# Patient Record
Sex: MALE | Race: WHITE | NOT HISPANIC OR LATINO
[De-identification: names, ages, dates, MRNs, and addresses within clinical notes are randomized per-mention and may not be internally consistent; named-entity substitution may affect disease eponyms.]

---

## 2018-02-06 ENCOUNTER — RECORD ABSTRACTING (OUTPATIENT)
Age: 78
End: 2018-02-06

## 2018-02-06 DIAGNOSIS — Z87.19 PERSONAL HISTORY OF OTHER DISEASES OF THE DIGESTIVE SYSTEM: ICD-10-CM

## 2018-03-27 ENCOUNTER — APPOINTMENT (OUTPATIENT)
Dept: UROLOGY | Facility: CLINIC | Age: 78
End: 2018-03-27
Payer: MEDICARE

## 2018-03-27 VITALS
HEART RATE: 80 BPM | SYSTOLIC BLOOD PRESSURE: 163 MMHG | DIASTOLIC BLOOD PRESSURE: 69 MMHG | BODY MASS INDEX: 28.76 KG/M2 | TEMPERATURE: 94.5 F | WEIGHT: 210 LBS | RESPIRATION RATE: 17 BRPM | HEIGHT: 71.5 IN

## 2018-03-27 PROCEDURE — 99203 OFFICE O/P NEW LOW 30 MIN: CPT

## 2018-05-12 RX ORDER — ASPIRIN 81 MG
81 TABLET, DELAYED RELEASE (ENTERIC COATED) ORAL DAILY
Refills: 0 | Status: ACTIVE | COMMUNITY

## 2018-05-15 ENCOUNTER — APPOINTMENT (OUTPATIENT)
Dept: CARDIOLOGY | Facility: CLINIC | Age: 78
End: 2018-05-15
Payer: MEDICARE

## 2018-05-15 VITALS
HEART RATE: 63 BPM | WEIGHT: 225 LBS | RESPIRATION RATE: 18 BRPM | HEIGHT: 71 IN | DIASTOLIC BLOOD PRESSURE: 70 MMHG | SYSTOLIC BLOOD PRESSURE: 140 MMHG | BODY MASS INDEX: 31.5 KG/M2

## 2018-05-15 PROCEDURE — 99214 OFFICE O/P EST MOD 30 MIN: CPT

## 2018-05-15 PROCEDURE — 93000 ELECTROCARDIOGRAM COMPLETE: CPT

## 2018-05-15 RX ORDER — TELMISARTAN 40 MG/1
40 TABLET ORAL DAILY
Refills: 0 | Status: DISCONTINUED | COMMUNITY
End: 2018-05-15

## 2018-09-25 ENCOUNTER — APPOINTMENT (OUTPATIENT)
Dept: UROLOGY | Facility: CLINIC | Age: 78
End: 2018-09-25
Payer: MEDICARE

## 2018-09-25 PROCEDURE — 99213 OFFICE O/P EST LOW 20 MIN: CPT

## 2018-09-26 LAB — PSA SERPL-MCNC: 4.18 NG/ML

## 2018-10-08 ENCOUNTER — RX RENEWAL (OUTPATIENT)
Age: 78
End: 2018-10-08

## 2018-10-16 ENCOUNTER — APPOINTMENT (OUTPATIENT)
Dept: CARDIOLOGY | Facility: CLINIC | Age: 78
End: 2018-10-16

## 2018-10-26 ENCOUNTER — APPOINTMENT (OUTPATIENT)
Dept: CARDIOLOGY | Facility: CLINIC | Age: 78
End: 2018-10-26
Payer: MEDICARE

## 2018-10-26 PROCEDURE — 93880 EXTRACRANIAL BILAT STUDY: CPT

## 2018-10-26 PROCEDURE — 93306 TTE W/DOPPLER COMPLETE: CPT

## 2018-11-30 ENCOUNTER — APPOINTMENT (OUTPATIENT)
Dept: CARDIOLOGY | Facility: CLINIC | Age: 78
End: 2018-11-30

## 2019-02-26 ENCOUNTER — RECORD ABSTRACTING (OUTPATIENT)
Age: 79
End: 2019-02-26

## 2019-02-26 RX ORDER — FINASTERIDE 5 MG/1
5 TABLET, FILM COATED ORAL DAILY
Qty: 90 | Refills: 3 | Status: DISCONTINUED | COMMUNITY
Start: 2018-09-25 | End: 2019-02-26

## 2019-02-26 RX ORDER — UBIDECARENONE 50 MG
CAPSULE ORAL DAILY
Refills: 0 | Status: ACTIVE | COMMUNITY

## 2019-02-28 ENCOUNTER — NON-APPOINTMENT (OUTPATIENT)
Age: 79
End: 2019-02-28

## 2019-02-28 ENCOUNTER — APPOINTMENT (OUTPATIENT)
Dept: CARDIOLOGY | Facility: CLINIC | Age: 79
End: 2019-02-28
Payer: MEDICARE

## 2019-02-28 VITALS
HEART RATE: 67 BPM | SYSTOLIC BLOOD PRESSURE: 130 MMHG | RESPIRATION RATE: 16 BRPM | BODY MASS INDEX: 31.08 KG/M2 | HEIGHT: 71 IN | WEIGHT: 222 LBS | DIASTOLIC BLOOD PRESSURE: 78 MMHG

## 2019-02-28 DIAGNOSIS — I25.2 OLD MYOCARDIAL INFARCTION: ICD-10-CM

## 2019-02-28 DIAGNOSIS — Z86.79 PERSONAL HISTORY OF OTHER DISEASES OF THE CIRCULATORY SYSTEM: ICD-10-CM

## 2019-02-28 PROCEDURE — 93000 ELECTROCARDIOGRAM COMPLETE: CPT

## 2019-02-28 PROCEDURE — 99214 OFFICE O/P EST MOD 30 MIN: CPT

## 2019-02-28 NOTE — PHYSICAL EXAM
[Normal Conjunctiva] : the conjunctiva exhibited no abnormalities [Eyelids - No Xanthelasma] : the eyelids demonstrated no xanthelasmas [Normal Oral Mucosa] : normal oral mucosa [No Oral Pallor] : no oral pallor [No Oral Cyanosis] : no oral cyanosis [Normal Jugular Venous A Waves Present] : normal jugular venous A waves present [Normal Jugular Venous V Waves Present] : normal jugular venous V waves present [No Jugular Venous Bone A Waves] : no jugular venous bone A waves [Respiration, Rhythm And Depth] : normal respiratory rhythm and effort [Exaggerated Use Of Accessory Muscles For Inspiration] : no accessory muscle use [Auscultation Breath Sounds / Voice Sounds] : lungs were clear to auscultation bilaterally [Heart Rate And Rhythm] : heart rate and rhythm were normal [Heart Sounds] : normal S1 and S2 [Murmurs] : no murmurs present [FreeTextEntry1] : Obese soft nontender no masses [Abnormal Walk] : normal gait [Gait - Sufficient For Exercise Testing] : the gait was sufficient for exercise testing [Nail Clubbing] : no clubbing of the fingernails [Cyanosis, Localized] : no localized cyanosis [Petechial Hemorrhages (___cm)] : no petechial hemorrhages [Skin Color & Pigmentation] : normal skin color and pigmentation [] : no rash [No Venous Stasis] : no venous stasis [Skin Lesions] : no skin lesions [No Skin Ulcers] : no skin ulcer [No Xanthoma] : no  xanthoma was observed [Oriented To Time, Place, And Person] : oriented to person, place, and time [Affect] : the affect was normal [Mood] : the mood was normal [No Anxiety] : not feeling anxious

## 2019-02-28 NOTE — HISTORY OF PRESENT ILLNESS
[FreeTextEntry1] : \par CHIEF COMPLAINT:  The patient is here for cardiac reevaluation.  History of:\par 1.	Coronary artery disease, prior inferior wall myocardial infarction.\par 2.	Hyperlipidemia.\par 3.	Mild obesity.\par \par \par

## 2019-02-28 NOTE — ASSESSMENT
[FreeTextEntry1] : ECG: Normal sinus rhythm 67. Frequent APCs. Possible old inferior infarct pattern. No significant ST or T wave changes.\par \par Carotid study 10/26/18:\par Mild bilateral plaquing. No hemodynamically significant stenosis.\par \par Echocardiogram 10/26/18:\par Small inferior wall MI. Left ventricular ejection fraction 70%.\par Mild mitral and tricuspid insufficiency.\par Unchanged from prior.\par  \par \par Laboratory data \par                 1/27/19:     2/2/18\par Cholesterol 108           194\par HDL              37             32\par LDL              43            125\par A1c             5.9              5.9\par \par 9/7/17, NUCLEAR SUMMARY\par 1. Abnormal Sestamibi myocardial perfusion SPECT imaging.\par 2. There was a small sized, moderate intensity, fixed defect of the basal inferior, basal inferoseptal\par and mid inferior wall consistent with infarction.\par 3. Left ventricular function was low normal with an EF of 52%.\par 4. There is mild hypokinesis of the basal inferior and mid inferior walls.\par 5. The EKG was negative for ischemia.\par 6. The patient developed occasional PACs during exercise.\par 7. The blood pressure response was normal.\par 8. The patient developed shortness of breath during the stress exam. The symptoms resolved with\par rest.\par 9. The test was terminated due to shortness of breath.\par 10. The patient's functional capacity was excellent.\par 11. The Duke Treadmill Score was 7, consistent with low risk.\par 12. When compared to prior study dated 5/28/2015, there has been no significant interval change.\par 13. Recommend clinical correlation with the above findings.\par \par Impression: \par 1. Patient is now moderately obese. BMI 31\par \par 2. EKG reflects APCs but no new ischemic changes with just old inferior infarct pattern.\par \par 2. Cholesterol previously was not adequately controlled for his profile. With addition of Cholestoff and diet LDL is at target \par \par 3. His last nuclear stress test September showed a fixed inferior wall defect but no significant ischemia. Echo confirms that same area and shows preserved fxn,\par \par 4. Only mild bilateral carotid plaquing\par \par Plan:\par 1. Continuation of current meds\par 2. Weight loss\par 3. Repeat blood work at [facility]D office\par \par Followup in 6 months\par \par \par

## 2019-02-28 NOTE — REASON FOR VISIT
[FreeTextEntry1] : He feels well.  He denies any chest pain, shortness of breath, palpitations, PND, orthopnea, or edema.  No other medical problems.\par \par He did not tolerate statins, but was put on Cholestoff by Dr Elder\par \par No other new medical issues

## 2019-03-05 ENCOUNTER — APPOINTMENT (OUTPATIENT)
Dept: UROLOGY | Facility: CLINIC | Age: 79
End: 2019-03-05

## 2019-08-20 ENCOUNTER — RECORD ABSTRACTING (OUTPATIENT)
Age: 79
End: 2019-08-20

## 2019-08-20 RX ORDER — TELMISARTAN 20 MG/1
20 TABLET ORAL
Refills: 0 | Status: ACTIVE | COMMUNITY

## 2019-08-20 RX ORDER — PLANT STANOL ESTER 450 MG
TABLET ORAL
Refills: 0 | Status: ACTIVE | COMMUNITY

## 2019-08-29 ENCOUNTER — NON-APPOINTMENT (OUTPATIENT)
Age: 79
End: 2019-08-29

## 2019-08-29 ENCOUNTER — APPOINTMENT (OUTPATIENT)
Dept: CARDIOLOGY | Facility: CLINIC | Age: 79
End: 2019-08-29
Payer: MEDICARE

## 2019-08-29 VITALS
SYSTOLIC BLOOD PRESSURE: 130 MMHG | OXYGEN SATURATION: 98 % | DIASTOLIC BLOOD PRESSURE: 65 MMHG | WEIGHT: 224 LBS | HEIGHT: 71 IN | HEART RATE: 74 BPM | BODY MASS INDEX: 31.36 KG/M2 | RESPIRATION RATE: 16 BRPM

## 2019-08-29 DIAGNOSIS — Z00.00 ENCOUNTER FOR GENERAL ADULT MEDICAL EXAMINATION W/OUT ABNORMAL FINDINGS: ICD-10-CM

## 2019-08-29 DIAGNOSIS — N20.0 CALCULUS OF KIDNEY: ICD-10-CM

## 2019-08-29 PROCEDURE — 99214 OFFICE O/P EST MOD 30 MIN: CPT

## 2019-08-29 PROCEDURE — 93000 ELECTROCARDIOGRAM COMPLETE: CPT

## 2019-08-29 NOTE — PHYSICAL EXAM
[Normal Conjunctiva] : the conjunctiva exhibited no abnormalities [Eyelids - No Xanthelasma] : the eyelids demonstrated no xanthelasmas [No Oral Pallor] : no oral pallor [Normal Oral Mucosa] : normal oral mucosa [Normal Jugular Venous A Waves Present] : normal jugular venous A waves present [No Oral Cyanosis] : no oral cyanosis [Normal Jugular Venous V Waves Present] : normal jugular venous V waves present [No Jugular Venous Bone A Waves] : no jugular venous bone A waves [Exaggerated Use Of Accessory Muscles For Inspiration] : no accessory muscle use [Respiration, Rhythm And Depth] : normal respiratory rhythm and effort [Heart Sounds] : normal S1 and S2 [Auscultation Breath Sounds / Voice Sounds] : lungs were clear to auscultation bilaterally [Heart Rate And Rhythm] : heart rate and rhythm were normal [Murmurs] : no murmurs present [Abnormal Walk] : normal gait [Gait - Sufficient For Exercise Testing] : the gait was sufficient for exercise testing [Cyanosis, Localized] : no localized cyanosis [Petechial Hemorrhages (___cm)] : no petechial hemorrhages [Nail Clubbing] : no clubbing of the fingernails [Skin Color & Pigmentation] : normal skin color and pigmentation [] : no rash [No Venous Stasis] : no venous stasis [Skin Lesions] : no skin lesions [No Skin Ulcers] : no skin ulcer [No Xanthoma] : no  xanthoma was observed [Oriented To Time, Place, And Person] : oriented to person, place, and time [Affect] : the affect was normal [Mood] : the mood was normal [No Anxiety] : not feeling anxious [FreeTextEntry1] : Obese soft nontender no masses

## 2019-08-29 NOTE — REASON FOR VISIT
[FreeTextEntry1] : He feels well.  He denies any chest pain, shortness of breath, palpitations, PND, orthopnea, or edema.  No other medical problems.\par \par HX of statin intolerance, now tolerating Cholestoff prescribed by Dr Elder\par \par No other new medical issues or recent hospitalizations\par \par \par He has no regular exercise regimen, with her, is physically very active without any exertional complaints.

## 2019-08-29 NOTE — ASSESSMENT
[FreeTextEntry1] : ECG: NSR at 74 BPM, No sig ST or T wave abn\par \par Carotid study 10/26/18:\par Mild bilateral plaquing. No hemodynamically significant stenosis.\par \par Echocardiogram 10/26/18:\par Small inferior wall MI. Left ventricular ejection fraction 70%.\par Mild mitral and tricuspid insufficiency.\par Unchanged from prior.\par  \par \par Laboratory data \par                 1/27/19:     2/2/18\par Cholesterol 108           194\par HDL              37             32\par LDL              43            125\par A1c             5.9              5.9\par \par 9/7/17, NUCLEAR SUMMARY\par 1. Abnormal Sestamibi myocardial perfusion SPECT imaging.\par 2. There was a small sized, moderate intensity, fixed defect of the basal inferior, basal inferoseptal\par and mid inferior wall consistent with infarction.\par 3. Left ventricular function was low normal with an EF of 52%.\par 4. There is mild hypokinesis of the basal inferior and mid inferior walls.\par 5. The EKG was negative for ischemia.\par 6. The patient developed occasional PACs during exercise.\par 7. The blood pressure response was normal.\par 8. The patient developed shortness of breath during the stress exam. The symptoms resolved with\par rest.\par 9. The test was terminated due to shortness of breath.\par 10. The patient's functional capacity was excellent.\par 11. The Duke Treadmill Score was 7, consistent with low risk.\par 12. When compared to prior study dated 5/28/2015, there has been no significant interval change.\par 13. Recommend clinical correlation with the above findings.\par \par Impression: \par 1. Patient has maintained a BMI of 31 and aware of that .\par \par 2. EKG reflects NSR, no APCs seen.\par \par 2. Cholesterol previously was not adequately controlled for his profile. With addition of Cholestoff and diet LDL is at target\par \par 3. His last nuclear stress test September showed a fixed inferior wall defect but no significant ischemia. Echo confirms that same area and shows preserved fxn,\par \par 4. Only mild bilateral carotid plaquing\par \par 5.Blood pressure today 130/65 and stable\par \par Plan:\par 1. Continuation of current meds\par 2. Encouraged to increase exercise regimen to 3-4 days a week as tolerated.\par 3. Repeat blood work at PMD office and have faxed to my office.\par 4. Diet modification to help prompt weight loss\par 5. Repeat nuclear stress test to reassess cardiac tolerance and history fixed inferior wall defect.\par \par \par Followup in 6 months\par \par \par

## 2020-01-06 ENCOUNTER — MEDICATION RENEWAL (OUTPATIENT)
Age: 80
End: 2020-01-06

## 2020-01-06 ENCOUNTER — RX RENEWAL (OUTPATIENT)
Age: 80
End: 2020-01-06

## 2020-01-08 ENCOUNTER — APPOINTMENT (OUTPATIENT)
Dept: CARDIOLOGY | Facility: CLINIC | Age: 80
End: 2020-01-08
Payer: MEDICARE

## 2020-01-08 PROCEDURE — A9500: CPT

## 2020-01-08 PROCEDURE — 78452 HT MUSCLE IMAGE SPECT MULT: CPT

## 2020-01-08 PROCEDURE — 93015 CV STRESS TEST SUPVJ I&R: CPT

## 2020-01-10 RX ORDER — KIT FOR THE PREPARATION OF TECHNETIUM TC99M SESTAMIBI 1 MG/5ML
INJECTION, POWDER, LYOPHILIZED, FOR SOLUTION PARENTERAL
Refills: 0 | Status: COMPLETED | OUTPATIENT
Start: 2020-01-10

## 2020-01-10 RX ADMIN — KIT FOR THE PREPARATION OF TECHNETIUM TC99M SESTAMIBI 0: 1 INJECTION, POWDER, LYOPHILIZED, FOR SOLUTION PARENTERAL at 00:00

## 2020-03-03 ENCOUNTER — EMERGENCY (EMERGENCY)
Facility: HOSPITAL | Age: 80
LOS: 1 days | Discharge: HOME | End: 2020-03-03
Attending: EMERGENCY MEDICINE
Payer: MEDICARE

## 2020-03-03 VITALS
HEIGHT: 71.5 IN | OXYGEN SATURATION: 99 % | HEART RATE: 65 BPM | WEIGHT: 210.1 LBS | DIASTOLIC BLOOD PRESSURE: 82 MMHG | RESPIRATION RATE: 18 BRPM | SYSTOLIC BLOOD PRESSURE: 152 MMHG | TEMPERATURE: 98 F

## 2020-03-03 VITALS
TEMPERATURE: 98 F | HEART RATE: 67 BPM | RESPIRATION RATE: 20 BRPM | OXYGEN SATURATION: 98 % | SYSTOLIC BLOOD PRESSURE: 145 MMHG | DIASTOLIC BLOOD PRESSURE: 70 MMHG

## 2020-03-03 LAB
ALBUMIN SERPL ELPH-MCNC: 4.5 G/DL — SIGNIFICANT CHANGE UP (ref 3.3–5.2)
ALP SERPL-CCNC: 76 U/L — SIGNIFICANT CHANGE UP (ref 40–120)
ALT FLD-CCNC: 23 U/L — SIGNIFICANT CHANGE UP
ANION GAP SERPL CALC-SCNC: 14 MMOL/L — SIGNIFICANT CHANGE UP (ref 5–17)
APPEARANCE UR: CLEAR — SIGNIFICANT CHANGE UP
AST SERPL-CCNC: 23 U/L — SIGNIFICANT CHANGE UP
BACTERIA # UR AUTO: NEGATIVE — SIGNIFICANT CHANGE UP
BASOPHILS # BLD AUTO: 0.07 K/UL — SIGNIFICANT CHANGE UP (ref 0–0.2)
BASOPHILS NFR BLD AUTO: 0.7 % — SIGNIFICANT CHANGE UP (ref 0–2)
BILIRUB SERPL-MCNC: 0.7 MG/DL — SIGNIFICANT CHANGE UP (ref 0.4–2)
BILIRUB UR-MCNC: NEGATIVE — SIGNIFICANT CHANGE UP
BUN SERPL-MCNC: 27 MG/DL — HIGH (ref 8–20)
CALCIUM SERPL-MCNC: 10 MG/DL — SIGNIFICANT CHANGE UP (ref 8.6–10.2)
CHLORIDE SERPL-SCNC: 99 MMOL/L — SIGNIFICANT CHANGE UP (ref 98–107)
CO2 SERPL-SCNC: 26 MMOL/L — SIGNIFICANT CHANGE UP (ref 22–29)
COLOR SPEC: YELLOW — SIGNIFICANT CHANGE UP
CREAT SERPL-MCNC: 1.09 MG/DL — SIGNIFICANT CHANGE UP (ref 0.5–1.3)
DIFF PNL FLD: NEGATIVE — SIGNIFICANT CHANGE UP
EOSINOPHIL # BLD AUTO: 0.43 K/UL — SIGNIFICANT CHANGE UP (ref 0–0.5)
EOSINOPHIL NFR BLD AUTO: 4.2 % — SIGNIFICANT CHANGE UP (ref 0–6)
EPI CELLS # UR: NEGATIVE — SIGNIFICANT CHANGE UP
GLUCOSE SERPL-MCNC: 117 MG/DL — HIGH (ref 70–99)
GLUCOSE UR QL: NEGATIVE MG/DL — SIGNIFICANT CHANGE UP
HCT VFR BLD CALC: 42 % — SIGNIFICANT CHANGE UP (ref 39–50)
HGB BLD-MCNC: 13.5 G/DL — SIGNIFICANT CHANGE UP (ref 13–17)
IMM GRANULOCYTES NFR BLD AUTO: 0.8 % — SIGNIFICANT CHANGE UP (ref 0–1.5)
KETONES UR-MCNC: NEGATIVE — SIGNIFICANT CHANGE UP
LEUKOCYTE ESTERASE UR-ACNC: NEGATIVE — SIGNIFICANT CHANGE UP
LIDOCAIN IGE QN: 60 U/L — HIGH (ref 22–51)
LYMPHOCYTES # BLD AUTO: 1.5 K/UL — SIGNIFICANT CHANGE UP (ref 1–3.3)
LYMPHOCYTES # BLD AUTO: 14.7 % — SIGNIFICANT CHANGE UP (ref 13–44)
MCHC RBC-ENTMCNC: 28.7 PG — SIGNIFICANT CHANGE UP (ref 27–34)
MCHC RBC-ENTMCNC: 32.1 GM/DL — SIGNIFICANT CHANGE UP (ref 32–36)
MCV RBC AUTO: 89.2 FL — SIGNIFICANT CHANGE UP (ref 80–100)
MONOCYTES # BLD AUTO: 0.76 K/UL — SIGNIFICANT CHANGE UP (ref 0–0.9)
MONOCYTES NFR BLD AUTO: 7.4 % — SIGNIFICANT CHANGE UP (ref 2–14)
NEUTROPHILS # BLD AUTO: 7.39 K/UL — SIGNIFICANT CHANGE UP (ref 1.8–7.4)
NEUTROPHILS NFR BLD AUTO: 72.2 % — SIGNIFICANT CHANGE UP (ref 43–77)
NITRITE UR-MCNC: NEGATIVE — SIGNIFICANT CHANGE UP
PH UR: 5 — SIGNIFICANT CHANGE UP (ref 5–8)
PLATELET # BLD AUTO: 310 K/UL — SIGNIFICANT CHANGE UP (ref 150–400)
POTASSIUM SERPL-MCNC: 4.3 MMOL/L — SIGNIFICANT CHANGE UP (ref 3.5–5.3)
POTASSIUM SERPL-SCNC: 4.3 MMOL/L — SIGNIFICANT CHANGE UP (ref 3.5–5.3)
PROT SERPL-MCNC: 7.9 G/DL — SIGNIFICANT CHANGE UP (ref 6.6–8.7)
PROT UR-MCNC: 15 MG/DL
RBC # BLD: 4.71 M/UL — SIGNIFICANT CHANGE UP (ref 4.2–5.8)
RBC # FLD: 13.9 % — SIGNIFICANT CHANGE UP (ref 10.3–14.5)
RBC CASTS # UR COMP ASSIST: NEGATIVE /HPF — SIGNIFICANT CHANGE UP (ref 0–4)
SODIUM SERPL-SCNC: 139 MMOL/L — SIGNIFICANT CHANGE UP (ref 135–145)
SP GR SPEC: 1.02 — SIGNIFICANT CHANGE UP (ref 1.01–1.02)
UROBILINOGEN FLD QL: NEGATIVE MG/DL — SIGNIFICANT CHANGE UP
WBC # BLD: 10.23 K/UL — SIGNIFICANT CHANGE UP (ref 3.8–10.5)
WBC # FLD AUTO: 10.23 K/UL — SIGNIFICANT CHANGE UP (ref 3.8–10.5)
WBC UR QL: SIGNIFICANT CHANGE UP

## 2020-03-03 PROCEDURE — 93010 ELECTROCARDIOGRAM REPORT: CPT

## 2020-03-03 PROCEDURE — 76705 ECHO EXAM OF ABDOMEN: CPT

## 2020-03-03 PROCEDURE — 83690 ASSAY OF LIPASE: CPT

## 2020-03-03 PROCEDURE — 36415 COLL VENOUS BLD VENIPUNCTURE: CPT

## 2020-03-03 PROCEDURE — 76705 ECHO EXAM OF ABDOMEN: CPT | Mod: 26

## 2020-03-03 PROCEDURE — 93005 ELECTROCARDIOGRAM TRACING: CPT

## 2020-03-03 PROCEDURE — 85027 COMPLETE CBC AUTOMATED: CPT

## 2020-03-03 PROCEDURE — 80053 COMPREHEN METABOLIC PANEL: CPT

## 2020-03-03 PROCEDURE — 99284 EMERGENCY DEPT VISIT MOD MDM: CPT | Mod: 25

## 2020-03-03 PROCEDURE — 81001 URINALYSIS AUTO W/SCOPE: CPT

## 2020-03-03 PROCEDURE — 74177 CT ABD & PELVIS W/CONTRAST: CPT

## 2020-03-03 PROCEDURE — 74177 CT ABD & PELVIS W/CONTRAST: CPT | Mod: 26

## 2020-03-03 PROCEDURE — 96374 THER/PROPH/DIAG INJ IV PUSH: CPT | Mod: XU

## 2020-03-03 PROCEDURE — 96375 TX/PRO/DX INJ NEW DRUG ADDON: CPT

## 2020-03-03 PROCEDURE — 99285 EMERGENCY DEPT VISIT HI MDM: CPT

## 2020-03-03 RX ORDER — SODIUM CHLORIDE 9 MG/ML
3 INJECTION INTRAMUSCULAR; INTRAVENOUS; SUBCUTANEOUS ONCE
Refills: 0 | Status: COMPLETED | OUTPATIENT
Start: 2020-03-03 | End: 2020-03-03

## 2020-03-03 RX ORDER — HYDROMORPHONE HYDROCHLORIDE 2 MG/ML
1 INJECTION INTRAMUSCULAR; INTRAVENOUS; SUBCUTANEOUS ONCE
Refills: 0 | Status: DISCONTINUED | OUTPATIENT
Start: 2020-03-03 | End: 2020-03-03

## 2020-03-03 RX ORDER — MORPHINE SULFATE 50 MG/1
4 CAPSULE, EXTENDED RELEASE ORAL ONCE
Refills: 0 | Status: DISCONTINUED | OUTPATIENT
Start: 2020-03-03 | End: 2020-03-03

## 2020-03-03 RX ORDER — METOCLOPRAMIDE HCL 10 MG
10 TABLET ORAL ONCE
Refills: 0 | Status: COMPLETED | OUTPATIENT
Start: 2020-03-03 | End: 2020-03-03

## 2020-03-03 RX ORDER — FAMOTIDINE 10 MG/ML
20 INJECTION INTRAVENOUS ONCE
Refills: 0 | Status: COMPLETED | OUTPATIENT
Start: 2020-03-03 | End: 2020-03-03

## 2020-03-03 RX ADMIN — Medication 104 MILLIGRAM(S): at 02:56

## 2020-03-03 RX ADMIN — MORPHINE SULFATE 4 MILLIGRAM(S): 50 CAPSULE, EXTENDED RELEASE ORAL at 03:05

## 2020-03-03 RX ADMIN — Medication 10 MILLIGRAM(S): at 02:57

## 2020-03-03 RX ADMIN — FAMOTIDINE 20 MILLIGRAM(S): 10 INJECTION INTRAVENOUS at 02:57

## 2020-03-03 RX ADMIN — SODIUM CHLORIDE 3 MILLILITER(S): 9 INJECTION INTRAMUSCULAR; INTRAVENOUS; SUBCUTANEOUS at 02:57

## 2020-03-03 RX ADMIN — HYDROMORPHONE HYDROCHLORIDE 1 MILLIGRAM(S): 2 INJECTION INTRAMUSCULAR; INTRAVENOUS; SUBCUTANEOUS at 03:58

## 2020-03-03 RX ADMIN — HYDROMORPHONE HYDROCHLORIDE 1 MILLIGRAM(S): 2 INJECTION INTRAMUSCULAR; INTRAVENOUS; SUBCUTANEOUS at 06:00

## 2020-03-03 RX ADMIN — MORPHINE SULFATE 4 MILLIGRAM(S): 50 CAPSULE, EXTENDED RELEASE ORAL at 07:20

## 2020-03-03 NOTE — ED PROVIDER NOTE - NSFOLLOWUPINSTRUCTIONS_ED_ALL_ED_FT
Avoid spicy, fried and greasy foods   Follow up with your surgeon at [facility]-call today to schedule appt  Return sooner for any problems

## 2020-03-03 NOTE — ED ADULT NURSE REASSESSMENT NOTE - NS ED NURSE REASSESS COMMENT FT1
Pt. mental status unchanged. Pt. respirations even and unlabored. Pt. resting comfortably in stretcher. pt. states his pain is about a 5 and tolerable. Pt. to go to [facility] of gallbladder.

## 2020-03-03 NOTE — ED PROVIDER NOTE - PATIENT PORTAL LINK FT
You can access the FollowMyHealth Patient Portal offered by [facility] by registering at the following website: [email or web address]. By joining [facility]’s FollowMyHealth portal, you will also be able to view your health information using other applications (apps) compatible with our system.

## 2020-03-03 NOTE — ED PROVIDER NOTE - CLINICAL SUMMARY MEDICAL DECISION MAKING FREE TEXT BOX
Will check labs, EKG, CT abd/pelvis, will give Regulon and Pepcid, and reevaluate after medications.

## 2020-03-03 NOTE — ED PROVIDER NOTE - CONSTITUTIONAL, MLM
normal... Awake, alert, oriented to person, place, time/situation, appears uncomfortable, and in no apparent distress.

## 2020-03-03 NOTE — ED PROVIDER NOTE - OBJECTIVE STATEMENT
78 y/o M pt, with PMHx of HTN, presents to the ED c/o abd pain that began last night. Pt reports epigastric pain that began last night, with frequent belching, which he notes improves his pain. Pt notes that he had 10 spicy chicken wings 4 hours earlier. Denies CP, SOB, acute bloating, nausea, vomiting, any abd PSHx, any known allergies, EtOH consumption. Last BM was yesterday afternoon. Pt took Mylanta and Omeprazole with no relief. PMD: Dr. Lopez. Cardiologist: Dr. Dos Santos. Pt states that he has had a couple of cardiac nukes a couple months ago. Pt is a former smoker, quit 30 years ago.

## 2020-03-03 NOTE — ED PROVIDER NOTE - PROGRESS NOTE DETAILS
Labs, CT and US results reviewed with pt.  Pt pin free at this time and has a surgeon already at [facility] who he would like to follow up with.  Copies of labs and  results given to pt and advised to eat low fat diet  and tor return sooner for any problems

## 2020-03-03 NOTE — ED ADULT NURSE NOTE - OBJECTIVE STATEMENT
Assumed tp.c are at 0230. Pt. A&Ox3. Pt. respirations even and unlabored. Pt. states upper epigastric pain started at 11pm. Pt. nontender. Pt. has hx of IBs. Pt. had ct and found gall stones but the doctor is not sure. Pt. is belching. Denies N/V/D. Pt. is belching. Pt. states he had fried wings for dinner. Assumed pt.. care at 0230. Pt. A&Ox3. Pt. respirations even and unlabored. Pt. states upper epigastric pain started at 11pm. Pt. abdomen nontender. Pt. has hx of IBS vs gallstones. Pt. had ct and found gall stones but the doctor is not sure. Pt. is belching.  Pt. is belching. Pt. states he had fried wings for dinner. Denies N/V/D.

## 2020-09-11 ENCOUNTER — APPOINTMENT (OUTPATIENT)
Dept: UROLOGY | Facility: CLINIC | Age: 80
End: 2020-09-11
Payer: MEDICARE

## 2020-09-11 VITALS
DIASTOLIC BLOOD PRESSURE: 76 MMHG | BODY MASS INDEX: 28 KG/M2 | SYSTOLIC BLOOD PRESSURE: 134 MMHG | HEIGHT: 71 IN | HEART RATE: 60 BPM | RESPIRATION RATE: 17 BRPM | WEIGHT: 200 LBS

## 2020-09-11 VITALS — TEMPERATURE: 97.9 F

## 2020-09-11 DIAGNOSIS — N13.8 BENIGN PROSTATIC HYPERPLASIA WITH LOWER URINARY TRACT SYMPMS: ICD-10-CM

## 2020-09-11 DIAGNOSIS — N40.1 BENIGN PROSTATIC HYPERPLASIA WITH LOWER URINARY TRACT SYMPMS: ICD-10-CM

## 2020-09-11 PROCEDURE — 99214 OFFICE O/P EST MOD 30 MIN: CPT

## 2020-09-11 NOTE — PHYSICAL EXAM
[General Appearance - Well Developed] : well developed [Bowel Sounds] : normal bowel sounds [Prostate Size ___ gm] : prostate size [unfilled] gm [] : no respiratory distress [Oriented To Time, Place, And Person] : oriented to person, place, and time [Normal Station and Gait] : the gait and station were normal for the patient's age [No Focal Deficits] : no focal deficits

## 2020-09-11 NOTE — HISTORY OF PRESENT ILLNESS
[Nocturia] : nocturia [FreeTextEntry1] : PSA of 7 in 2015 We put him on finasteride which he took for a short time . His PSA reduced . He stopped finasteride when he ran out ad his PSA went up to 11.85

## 2020-09-11 NOTE — ASSESSMENT
[FreeTextEntry1] : We will restart finasteride . We will repeat MRI . He has no specific urological complaints .

## 2020-10-19 ENCOUNTER — APPOINTMENT (OUTPATIENT)
Dept: UROLOGY | Facility: CLINIC | Age: 80
End: 2020-10-19
Payer: MEDICARE

## 2020-10-19 ENCOUNTER — OUTPATIENT (OUTPATIENT)
Dept: OUTPATIENT SERVICES | Facility: HOSPITAL | Age: 80
LOS: 1 days | End: 2020-10-19
Payer: MEDICARE

## 2020-10-19 VITALS
DIASTOLIC BLOOD PRESSURE: 71 MMHG | BODY MASS INDEX: 28 KG/M2 | HEIGHT: 71 IN | WEIGHT: 200 LBS | RESPIRATION RATE: 16 BRPM | SYSTOLIC BLOOD PRESSURE: 125 MMHG | HEART RATE: 45 BPM

## 2020-10-19 VITALS — TEMPERATURE: 97.3 F

## 2020-10-19 DIAGNOSIS — R35.0 FREQUENCY OF MICTURITION: ICD-10-CM

## 2020-10-19 PROCEDURE — 76872 US TRANSRECTAL: CPT

## 2020-10-19 PROCEDURE — 55700: CPT

## 2020-10-19 PROCEDURE — 76377 3D RENDER W/INTRP POSTPROCES: CPT | Mod: 26

## 2020-10-19 PROCEDURE — 88305 TISSUE EXAM BY PATHOLOGIST: CPT | Mod: 26

## 2020-10-19 PROCEDURE — 76942 ECHO GUIDE FOR BIOPSY: CPT | Mod: 59

## 2020-10-19 PROCEDURE — 76942 ECHO GUIDE FOR BIOPSY: CPT | Mod: 26,59

## 2020-10-19 PROCEDURE — 76872 US TRANSRECTAL: CPT | Mod: 26

## 2020-10-21 ENCOUNTER — NON-APPOINTMENT (OUTPATIENT)
Age: 80
End: 2020-10-21

## 2020-10-22 DIAGNOSIS — R97.20 ELEVATED PROSTATE SPECIFIC ANTIGEN [PSA]: ICD-10-CM

## 2020-10-27 ENCOUNTER — NON-APPOINTMENT (OUTPATIENT)
Age: 80
End: 2020-10-27

## 2020-10-27 LAB — CORE LAB BIOPSY: NORMAL

## 2020-11-06 ENCOUNTER — OUTPATIENT (OUTPATIENT)
Dept: OUTPATIENT SERVICES | Facility: HOSPITAL | Age: 80
LOS: 1 days | Discharge: HOME | End: 2020-11-06
Payer: MEDICARE

## 2020-11-12 ENCOUNTER — APPOINTMENT (OUTPATIENT)
Dept: MULTI SPECIALTY CLINIC | Facility: CLINIC | Age: 80
End: 2020-11-12
Payer: MEDICARE

## 2020-11-12 VITALS
SYSTOLIC BLOOD PRESSURE: 120 MMHG | HEIGHT: 71 IN | HEART RATE: 57 BPM | DIASTOLIC BLOOD PRESSURE: 67 MMHG | RESPIRATION RATE: 17 BRPM | WEIGHT: 200 LBS | BODY MASS INDEX: 28 KG/M2

## 2020-11-12 VITALS — TEMPERATURE: 97.2 F

## 2020-11-12 PROCEDURE — 99215 OFFICE O/P EST HI 40 MIN: CPT

## 2020-11-12 PROCEDURE — 99204 OFFICE O/P NEW MOD 45 MIN: CPT

## 2020-11-12 RX ORDER — DIAZEPAM 5 MG/1
5 TABLET ORAL
Qty: 1 | Refills: 0 | Status: COMPLETED | COMMUNITY
Start: 2020-10-07 | End: 2020-11-12

## 2020-11-12 NOTE — PHYSICAL EXAM
[Normal] : oriented to person, place and time, the affect was normal, the mood was normal and not anxious [Sclera] : the sclera and conjunctiva were normal [Outer Ear] : the ears and nose were normal in appearance [] : no respiratory distress [Heart Rate And Rhythm] : heart rate and rhythm were normal [Abdomen Soft] : soft [Nondistended] : nondistended [Abdomen Tenderness] : non-tender [Musculoskeletal - Swelling] : no joint swelling [Nail Clubbing] : no clubbing  or cyanosis of the fingernails

## 2020-11-15 PROCEDURE — 77263 THER RADIOLOGY TX PLNG CPLX: CPT

## 2020-11-16 NOTE — REVIEW OF SYSTEMS
[Nocturia] : nocturia [Negative] : Endocrine [Hematuria: Grade 0] : Hematuria: Grade 0 [Urinary Incontinence: Grade 0] : Urinary Incontinence: Grade 0  [Urinary Retention: Grade 0] : Urinary Retention: Grade 0 [Urinary Tract Pain: Grade 0] : Urinary Tract Pain: Grade 0 [Urinary Urgency: Grade 0] : Urinary Urgency: Grade 0 [Urinary Frequency: Grade 0] : Urinary Frequency: Grade 0 [Ejaculation Disorder: Grade 1 - Diminished ejaculation] : Ejaculation Disorder: Grade 1 - Diminished ejaculation  [Erectile Dysfunction: Grade 1 - Decrease in erectile function (frequency or rigidity of erections) but intervention not indicated (e.g., medication or use of mechanical device, penile pump)] : Erectile Dysfunction: Grade 1 - Decrease in erectile function (frequency or rigidity of erections) but intervention not indicated (e.g., medication or use of mechanical device, penile pump) [Urinary Frequency] : no urinary frequency

## 2020-11-16 NOTE — DISEASE MANAGEMENT
[1] : T1 [c] : c [7(4+3)] : Lucretia Score 7(4+3) [] : Patient had a Prostate MRI [4] : 4 [IIC] : IIC [BiopsyDate] : 10/2020 [MeasuredProstateVolume] : 46 [TotalCores] : 4 [TotalPositiveCores] : 12 [MaxCoreInvolvement] : 40

## 2020-11-16 NOTE — HISTORY OF PRESENT ILLNESS
[FreeTextEntry1] : Mr. 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 He presents to multidisciplinary clinic to discuss his treatment options. \par \par Oncological History\par PSA Trend ng/mL\par 12/29/2014 - 5.09\par 09/12/2015 - 4.73\par 03/15/2017 - 5.82\par 06/08/2017 - 6.99\par 02/02/2018 - 7.27\par 03/10/2018 - 7.97\par 09/25/2018 - 4.18\par 02/28/2019 - 4.88\par 05/25/2019 - 5.06\par 11/02/2019 - 7.99\par 08/11/2020 - 11.85\par \par 9/21/20 - MRI of prostate showed 35cc prostate, 2 lesions, right posterior lateral PZ 8x7mm, and left posterior lateral PZ 9x5mm. PIRADS 4. No KD, SV or lymph nodes involvement.\par  \par 10/19/20 - Underwent MRI fused biopsy. Prostate US size 46cc. Pathology showing 4/12 positive cores. MRI target#1 - GS 4+3=7 - 2/2 cores, 20-30% of core with perineural invasion, MRI target#2 negative. Additionally 3/10 cores positive with GS 4+3=7 involving 40% of core with perineural invasion and further GS 3+4=7 and GS3+3=6. \par \par Patient presents today to discuss his radiation treatment option. No FH of cancer. He report nocturia 1-2 times per night and denies dysuria, hematuria, hesitancy, frequency and urgency. He has regular daily bowel function and he is not sexually active.

## 2020-11-24 NOTE — DISEASE MANAGEMENT
[1] : T1 [c] : c [0] : N0 [X] : MX [10-20] : 10 - 20 ng/mL [7(4+3)] : Lucretia Score 7(4+3) [] : Patient had a Prostate MRI [4] : 4 [IIC] : IIC [BiopsyDate] : 10/19/2020 [MeasuredProstateVolume] : 46 mL [TotalCores] : 4 [TotalPositiveCores] : 12 [MaxCoreInvolvement] : 40% [FreeTextEntry7] : PSA at diagnosis 11.85 ng/mL\par MRI prostate 9/21/2020: 35 mL prostate, PIRAD 4 lesion, no KD/SV invasion.

## 2020-11-24 NOTE — LETTER CLOSING
[Consult Closing:] : Thank you for allowing me to participate in the care of this patient.  If you have any questions, please do not hesitate to contact me. [Sincerely yours,] : Sincerely yours, [FreeTextEntry3] : Arian Chan MD\par System Chief of Urology, [facility] [place]\par  of Urology\par [facility]\par [facility] \par [address] [place] [address], [address] \[place] [address]

## 2020-11-24 NOTE — HISTORY OF PRESENT ILLNESS
[FreeTextEntry1] : Patient was referred by Dr. Marlow to [facility].\par Long history of elevated PSA.  Recently PSA increased to 11.85 ng/mL.\par Underwent MRI prostate September 2020.  Findings showed PI-RADS 4 lesion.  Underwent MRI/US fusion biopsy of the prostate 10/19/2020.  Findings revealed multiple cores prostate cancer, highest Lucretia 7 (4+3).\par \par Here for treatment options.

## 2020-12-08 PROCEDURE — 76872 US TRANSRECTAL: CPT | Mod: 26

## 2020-12-08 PROCEDURE — 55874 TPRNL PLMT BIODEGRDABL MATRL: CPT

## 2020-12-08 PROCEDURE — 55876 PLACE RT DEVICE/MARKER PROS: CPT

## 2020-12-14 ENCOUNTER — APPOINTMENT (OUTPATIENT)
Dept: MRI IMAGING | Facility: IMAGING CENTER | Age: 80
End: 2020-12-14

## 2020-12-27 ENCOUNTER — OUTPATIENT (OUTPATIENT)
Dept: OUTPATIENT SERVICES | Facility: HOSPITAL | Age: 80
LOS: 1 days | Discharge: HOME | End: 2020-12-27
Payer: MEDICARE

## 2020-12-27 PROCEDURE — 77300 RADIATION THERAPY DOSE PLAN: CPT | Mod: 26

## 2020-12-27 PROCEDURE — 77301 RADIOTHERAPY DOSE PLAN IMRT: CPT | Mod: 26

## 2020-12-27 PROCEDURE — 77338 DESIGN MLC DEVICE FOR IMRT: CPT | Mod: 26

## 2021-01-11 PROCEDURE — 77427 RADIATION TX MANAGEMENT X5: CPT

## 2021-01-11 PROCEDURE — 77387B: CUSTOM | Mod: 26

## 2021-01-12 ENCOUNTER — NON-APPOINTMENT (OUTPATIENT)
Age: 81
End: 2021-01-12

## 2021-01-12 VITALS
TEMPERATURE: 97.2 F | OXYGEN SATURATION: 99 % | SYSTOLIC BLOOD PRESSURE: 154 MMHG | HEART RATE: 65 BPM | RESPIRATION RATE: 17 BRPM | DIASTOLIC BLOOD PRESSURE: 71 MMHG | WEIGHT: 209 LBS | BODY MASS INDEX: 29.15 KG/M2

## 2021-01-12 PROCEDURE — 77387B: CUSTOM | Mod: 26

## 2021-01-12 NOTE — DISEASE MANAGEMENT
[Clinical] : TNM Stage: c [TTNM] : 1c [NTNM] : 0 [MTNM] : 0 [IIC] : IIC [de-identified] : 439 [de-identified] : 7000 cGy [de-identified] : Prostate

## 2021-01-13 PROCEDURE — 77387B: CUSTOM | Mod: 26

## 2021-01-14 PROCEDURE — 77387B: CUSTOM | Mod: 26

## 2021-01-15 PROCEDURE — 77387B: CUSTOM | Mod: 26

## 2021-01-19 ENCOUNTER — NON-APPOINTMENT (OUTPATIENT)
Age: 81
End: 2021-01-19

## 2021-01-19 VITALS
HEART RATE: 62 BPM | TEMPERATURE: 98.7 F | WEIGHT: 207.6 LBS | DIASTOLIC BLOOD PRESSURE: 78 MMHG | SYSTOLIC BLOOD PRESSURE: 146 MMHG | OXYGEN SATURATION: 100 % | BODY MASS INDEX: 28.95 KG/M2 | RESPIRATION RATE: 18 BRPM

## 2021-01-19 PROCEDURE — 77427 RADIATION TX MANAGEMENT X5: CPT

## 2021-01-19 PROCEDURE — 77387B: CUSTOM | Mod: 26

## 2021-01-19 NOTE — DISEASE MANAGEMENT
[Clinical] : TNM Stage: c [TTNM] : 1c [NTNM] : 0 [MTNM] : 0 [IIC] : IIC [de-identified] : 3539 [de-identified] : 7000 cGy [de-identified] : Prostate

## 2021-01-20 PROCEDURE — 77387B: CUSTOM | Mod: 26

## 2021-01-21 PROCEDURE — 77387B: CUSTOM | Mod: 26

## 2021-01-22 PROCEDURE — 77387B: CUSTOM | Mod: 26

## 2021-01-25 ENCOUNTER — NON-APPOINTMENT (OUTPATIENT)
Age: 81
End: 2021-01-25

## 2021-01-25 VITALS
OXYGEN SATURATION: 97 % | WEIGHT: 207.2 LBS | DIASTOLIC BLOOD PRESSURE: 73 MMHG | RESPIRATION RATE: 16 BRPM | SYSTOLIC BLOOD PRESSURE: 134 MMHG | HEART RATE: 60 BPM | BODY MASS INDEX: 28.9 KG/M2

## 2021-01-25 PROCEDURE — 77387B: CUSTOM | Mod: 26

## 2021-01-25 NOTE — DISEASE MANAGEMENT
[Clinical] : TNM Stage: c [TTNM] : 1c [FreeTextEntry4] : adenocarcinoma [NTNM] : 0 [MTNM] : 0 [IIC] : IIC [de-identified] : 4674 [de-identified] : 7000 cGy [de-identified] : Prostate

## 2021-01-26 PROCEDURE — 77427 RADIATION TX MANAGEMENT X5: CPT

## 2021-01-26 PROCEDURE — 77387B: CUSTOM | Mod: 26

## 2021-01-27 PROCEDURE — 77387B: CUSTOM | Mod: 26

## 2021-01-28 PROCEDURE — 77387B: CUSTOM | Mod: 26

## 2021-01-29 PROCEDURE — 77387B: CUSTOM | Mod: 26

## 2021-02-03 ENCOUNTER — NON-APPOINTMENT (OUTPATIENT)
Age: 81
End: 2021-02-03

## 2021-02-03 VITALS
HEART RATE: 59 BPM | SYSTOLIC BLOOD PRESSURE: 129 MMHG | BODY MASS INDEX: 28.9 KG/M2 | OXYGEN SATURATION: 97 % | DIASTOLIC BLOOD PRESSURE: 68 MMHG | WEIGHT: 207.2 LBS | RESPIRATION RATE: 16 BRPM

## 2021-02-03 PROCEDURE — 77387B: CUSTOM | Mod: 26

## 2021-02-04 PROCEDURE — 77387B: CUSTOM | Mod: 26

## 2021-02-04 PROCEDURE — 77427 RADIATION TX MANAGEMENT X5: CPT

## 2021-02-05 PROCEDURE — 77387B: CUSTOM | Mod: 26

## 2021-02-08 VITALS
HEART RATE: 91 BPM | BODY MASS INDEX: 29.01 KG/M2 | RESPIRATION RATE: 16 BRPM | TEMPERATURE: 98.6 F | DIASTOLIC BLOOD PRESSURE: 71 MMHG | WEIGHT: 208 LBS | SYSTOLIC BLOOD PRESSURE: 116 MMHG | OXYGEN SATURATION: 97 %

## 2021-02-08 PROCEDURE — 77387B: CUSTOM | Mod: 26

## 2021-02-08 NOTE — DISEASE MANAGEMENT
[Clinical] : TNM Stage: c [FreeTextEntry4] : adenocarcinoma [TTNM] : 1c [NTNM] : 0 [MTNM] : 0 [IIC] : IIC [de-identified] : 5639 [de-identified] : 7000 cGy [de-identified] : Prostate

## 2021-02-08 NOTE — DISEASE MANAGEMENT
[Clinical] : TNM Stage: c [FreeTextEntry4] : adenocarcinoma [TTNM] : 1c [NTNM] : 0 [MTNM] : 0 [IIC] : IIC [de-identified] : 4709 [de-identified] : 7000 cGy [de-identified] : Prostate

## 2021-02-09 PROCEDURE — 77387B: CUSTOM | Mod: 26

## 2021-02-10 PROCEDURE — 77387B: CUSTOM | Mod: 26

## 2021-02-11 PROCEDURE — 77387B: CUSTOM | Mod: 26

## 2021-02-12 PROCEDURE — 77387B: CUSTOM | Mod: 26

## 2021-02-16 ENCOUNTER — NON-APPOINTMENT (OUTPATIENT)
Age: 81
End: 2021-02-16

## 2021-02-16 VITALS
BODY MASS INDEX: 28.9 KG/M2 | RESPIRATION RATE: 16 BRPM | SYSTOLIC BLOOD PRESSURE: 126 MMHG | HEART RATE: 81 BPM | OXYGEN SATURATION: 99 % | DIASTOLIC BLOOD PRESSURE: 66 MMHG | WEIGHT: 207.2 LBS

## 2021-02-16 PROCEDURE — 77387B: CUSTOM | Mod: 26

## 2021-02-16 NOTE — DISEASE MANAGEMENT
[Clinical] : TNM Stage: c [IIC] : IIC [FreeTextEntry4] : adenocarcinoma [TTNM] : 1c [NTNM] : 0 [MTNM] : 0 [de-identified] : 9501 [de-identified] : 7000 cGy [de-identified] : Prostate

## 2021-02-16 NOTE — REVIEW OF SYSTEMS
[Diarrhea: Grade 2 - Increase of 4 - 6 stools per day over baseline; moderate increase in ostomy output compared to baseline] : Diarrhea: Grade 2 - Increase of 4 - 6 stools per day over baseline; moderate increase in ostomy output compared to baseline [Nausea: Grade 0] : Nausea: Grade 0

## 2021-02-17 PROCEDURE — 77387B: CUSTOM | Mod: 26

## 2021-02-17 PROCEDURE — 77427 RADIATION TX MANAGEMENT X5: CPT

## 2021-02-18 PROCEDURE — 77387B: CUSTOM | Mod: 26

## 2021-02-19 PROCEDURE — 77387B: CUSTOM | Mod: 26

## 2021-02-22 ENCOUNTER — NON-APPOINTMENT (OUTPATIENT)
Age: 81
End: 2021-02-22

## 2021-02-22 VITALS
OXYGEN SATURATION: 99 % | SYSTOLIC BLOOD PRESSURE: 115 MMHG | DIASTOLIC BLOOD PRESSURE: 56 MMHG | TEMPERATURE: 98 F | WEIGHT: 208.7 LBS | HEART RATE: 60 BPM | RESPIRATION RATE: 16 BRPM | BODY MASS INDEX: 29.11 KG/M2

## 2021-02-22 PROCEDURE — 77387B: CUSTOM | Mod: 26

## 2021-02-22 NOTE — DISEASE MANAGEMENT
[Clinical] : TNM Stage: c [IIC] : IIC [FreeTextEntry4] : adenocarcinoma [TTNM] : 1c [NTNM] : 0 [MTNM] : 0 [de-identified] : 3677 [de-identified] : 7000 cGy [de-identified] : Prostate

## 2021-02-23 PROCEDURE — 77387B: CUSTOM | Mod: 26

## 2021-03-24 ENCOUNTER — APPOINTMENT (OUTPATIENT)
Dept: RADIATION ONCOLOGY | Facility: CLINIC | Age: 81
End: 2021-03-24
Payer: MEDICARE

## 2021-03-24 DIAGNOSIS — Z92.3 PERSONAL HISTORY OF IRRADIATION: ICD-10-CM

## 2021-03-24 PROCEDURE — 99024 POSTOP FOLLOW-UP VISIT: CPT

## 2021-03-24 NOTE — REVIEW OF SYSTEMS
[IPSS Score (0-40): ___] : IPSS score: [unfilled] [EPIC-CP Score (0-60): ___] : EPIC-CP score: [unfilled] [Negative] : Allergic/Immunologic

## 2021-03-24 NOTE — LETTER CLOSING
[Sincerely yours,] : Sincerely yours, [FreeTextEntry3] : Zbigniew Sutherland MD\par Physician in Chief\par Department of Radiation Medicine\par [facility] [place]\par [facility] [place]\par \par  of Radiation Medicine\par [facility]\par at  [facility]\par \par Radiation \par [facility]/\par [facility] [place]\par [address]\par [place], [place] [address]\par \par Tel: [phone]\par Fax: [phone]\par

## 2021-03-24 NOTE — DISEASE MANAGEMENT
[Clinical] : TNM Stage: c [IIC] : IIC [FreeTextEntry4] : adenocarcinoma [TTNM] : 1c [NTNM] : 0 [MTNM] : 0 [de-identified] : 7000cGy [de-identified] : prostate and SVs

## 2021-03-24 NOTE — LETTER GREETING
[Dear Doctor] : Dear Doctor, [Follow-Up] : Your patient, [unfilled] was seen in my office today for follow-up [Please see my note below.] : Please see my note below. [FreeTextEntry2] : Allan Marlow MD

## 2021-03-24 NOTE — HISTORY OF PRESENT ILLNESS
[Home] : at home, [unfilled] , at the time of the visit. [Medical Office: ([facility])___] : at the medical office located in  [Verbal consent obtained from patient] : the patient, [unfilled] [FreeTextEntry1] : \par \par Mr. Brito is conferncing for post-treatment evaluation.  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 MRI with no KD, SV or lymph node involvement and 4/12 cores positive, with 2 cores showing 4+3=7 disease with maximal 40% of core involved.\par \par At last on-treatment visit, the patient denied dysuria.\par Reported nocturia 2-4x.\par Reported bowel habits vary greatly; recent calming of IBS; takes Imodium after 2nd loose stool prn.  \par

## 2021-04-21 LAB — PSA SERPL-MCNC: 1.04 NG/ML

## 2021-07-09 ENCOUNTER — RX RENEWAL (OUTPATIENT)
Age: 81
End: 2021-07-09

## 2021-08-18 ENCOUNTER — APPOINTMENT (OUTPATIENT)
Dept: CARDIOLOGY | Facility: CLINIC | Age: 81
End: 2021-08-18

## 2021-10-14 ENCOUNTER — APPOINTMENT (OUTPATIENT)
Dept: RADIATION ONCOLOGY | Facility: CLINIC | Age: 81
End: 2021-10-14

## 2021-10-20 ENCOUNTER — APPOINTMENT (OUTPATIENT)
Dept: CARDIOLOGY | Facility: CLINIC | Age: 81
End: 2021-10-20
Payer: MEDICARE

## 2021-10-20 VITALS
BODY MASS INDEX: 30.1 KG/M2 | DIASTOLIC BLOOD PRESSURE: 90 MMHG | HEIGHT: 71 IN | HEART RATE: 55 BPM | OXYGEN SATURATION: 95 % | SYSTOLIC BLOOD PRESSURE: 150 MMHG | WEIGHT: 215 LBS | RESPIRATION RATE: 16 BRPM

## 2021-10-20 DIAGNOSIS — R97.20 ELEVATED PROSTATE, SPECIFIC ANTIGEN [PSA]: ICD-10-CM

## 2021-10-20 PROCEDURE — 93000 ELECTROCARDIOGRAM COMPLETE: CPT

## 2021-10-20 PROCEDURE — 99214 OFFICE O/P EST MOD 30 MIN: CPT

## 2021-10-20 RX ORDER — DIAZEPAM 5 MG/1
5 TABLET ORAL
Qty: 1 | Refills: 0 | Status: DISCONTINUED | COMMUNITY
Start: 2020-11-12 | End: 2021-10-20

## 2021-10-20 RX ORDER — FINASTERIDE 5 MG/1
5 TABLET, FILM COATED ORAL DAILY
Qty: 90 | Refills: 3 | Status: DISCONTINUED | COMMUNITY
Start: 2018-03-27 | End: 2021-10-20

## 2021-10-20 RX ORDER — AMOXICILLIN AND CLAVULANATE POTASSIUM 875; 125 MG/1; MG/1
875-125 TABLET, COATED ORAL
Qty: 6 | Refills: 0 | Status: DISCONTINUED | COMMUNITY
Start: 2020-11-12 | End: 2021-10-20

## 2021-10-20 RX ORDER — FINASTERIDE 5 MG/1
5 TABLET, FILM COATED ORAL DAILY
Qty: 90 | Refills: 3 | Status: DISCONTINUED | COMMUNITY
Start: 2020-09-11 | End: 2021-10-20

## 2021-10-20 NOTE — ASSESSMENT
[FreeTextEntry1] : ECG:   Sinus rhythm at 55 with APCs.  No significant ST-T wave changes\par \par Laboratory data \par                 1/27/19:     2/2/18\par Cholesterol 108           194\par HDL              37             32\par LDL              43            125\par A1c             5.9             5.9\par \par Carotid study 10/26/18:\par Mild bilateral plaquing. No hemodynamically significant stenosis.\par \par Echocardiogram 10/26/18:\par Small inferior wall MI. Left ventricular ejection fraction 70%.\par Mild mitral and tricuspid insufficiency.\par Unchanged from prior.\par  \par \par Nuclear stress 1/8/2020:\par Exercise time: 6 minutes\par Heart rate: 146 (109%)\par SPECT imaging primarily fixed inferior wall and apical defects.\par                          No ischemia is demonstrated.\par Seems unchanged in comparison to prior stress test.\par \par 9/7/17, NUCLEAR SUMMARY\par 1. Abnormal Sestamibi myocardial perfusion SPECT imaging.\par 2. There was a small sized, moderate intensity, fixed defect of the basal inferior, basal inferoseptal\par and mid inferior wall consistent with infarction.\par 3. Left ventricular function was low normal with an EF of 52%.\par 4. There is mild hypokinesis of the basal inferior and mid inferior walls.\par 5. The EKG was negative for ischemia.\par 6. The patient developed occasional PACs during exercise.\par 7. The blood pressure response was normal.\par 8. The patient developed shortness of breath during the stress exam. The symptoms resolved with\par rest.\par 9. The test was terminated due to shortness of breath.\par 10. The patient's functional capacity was excellent.\par 11. The Duke Treadmill Score was 7, consistent with low risk.\par 12. When compared to prior study dated 5/28/2015, there has been no significant interval change.\par 13. Recommend clinical correlation with the above findings.\par \par Impression: \par 1. Patient has maintained a BMI of 30 and aware of that .\par \par 2. EKG reflects NSR, with rare APCs\par \par 2. Cholesterol previously was not adequately controlled for his profile. With addition of Cholestoff and diet LDL is at target\par \par 3. His last nuclear stress test September showed a fixed inferior wall defect but no significant ischemia. Echo confirms that same area and shows preserved fxn,\par \par 4. Only mild bilateral carotid plaquing\par \par 5.Blood pressure is elevated the patient states that he is a bit stressed out today\par \par Plan:\par 1. Continuation of current meds\par 2. Encouraged to increase exercise regimen to 3-4 days a week as tolerated.\par 3. Repeat blood work at [facility] and have faxed to my office.\par 4. Diet modification to help prompt weight loss\par \par Followup in 6 months\par \par \par

## 2022-04-06 ENCOUNTER — APPOINTMENT (OUTPATIENT)
Dept: CARDIOLOGY | Facility: CLINIC | Age: 82
End: 2022-04-06

## 2022-06-29 ENCOUNTER — APPOINTMENT (OUTPATIENT)
Dept: CARDIOLOGY | Facility: CLINIC | Age: 82
End: 2022-06-29

## 2022-06-29 VITALS
WEIGHT: 220 LBS | OXYGEN SATURATION: 98 % | SYSTOLIC BLOOD PRESSURE: 132 MMHG | RESPIRATION RATE: 16 BRPM | HEIGHT: 60 IN | BODY MASS INDEX: 43.19 KG/M2 | HEART RATE: 60 BPM | DIASTOLIC BLOOD PRESSURE: 68 MMHG

## 2022-06-29 DIAGNOSIS — E66.9 OBESITY, UNSPECIFIED: ICD-10-CM

## 2022-06-29 PROCEDURE — 99214 OFFICE O/P EST MOD 30 MIN: CPT

## 2022-06-29 PROCEDURE — 93000 ELECTROCARDIOGRAM COMPLETE: CPT

## 2022-06-29 RX ORDER — ROSUVASTATIN CALCIUM 10 MG/1
10 TABLET, FILM COATED ORAL
Refills: 0 | Status: ACTIVE | COMMUNITY
Start: 2022-05-25

## 2022-06-29 NOTE — ASSESSMENT
[FreeTextEntry1] : ECG:  Sinus arrhythmia at 60 bpm\par -Old inferior infarct. \par -Nonspecific intraventricular conduction delay\par ABNORMAL \par \par Laboratory data \par ----------1/27/19---2/2/18--4/6/22\par Chol-------108------194------91\par HDL---------37-------32-----32\par LDL---------43------125-----38\par H2h---------8.9-------5.9---6.0\par \par Carotid study 10/26/18:\par Mild bilateral plaquing. No hemodynamically significant stenosis.\par \par Echocardiogram 10/26/18:\par Small inferior wall MI. Left ventricular ejection fraction 70%.\par Mild mitral and tricuspid insufficiency.\par Unchanged from prior.\par \par Nuclear stress 1/8/2020:\par Exercise time: 6 minutes\par Heart rate: 146 (109%)\par SPECT imaging primarily fixed inferior wall and apical defects.\par                          No ischemia is demonstrated.\par Seems unchanged in comparison to prior stress test.\par \par 9/7/17, NUCLEAR SUMMARY\par 1. Abnormal Sestamibi myocardial perfusion SPECT imaging.\par 2. There was a small sized, moderate intensity, fixed defect of the basal inferior, basal inferoseptal\par and mid inferior wall consistent with infarction.\par 3. Left ventricular function was low normal with an EF of 52%.\par 4. There is mild hypokinesis of the basal inferior and mid inferior walls.\par 5. The EKG was negative for ischemia.\par 6. The patient developed occasional PACs during exercise.\par 7. The blood pressure response was normal.\par 8. The patient developed shortness of breath during the stress exam. The symptoms resolved with\par rest.\par 9. The test was terminated due to shortness of breath.\par 10. The patient's functional capacity was excellent.\par 11. The Duke Treadmill Score was 7, consistent with low risk.\par 12. When compared to prior study dated 5/28/2015, there has been no significant interval change.\par 13. Recommend clinical correlation with the above findings.\par \par Impression: \par 1. Patient has maintained a BMI of 30 and aware of implications\par \par 2. EKG reflects NSR, with rare APCs\par \par 2. Cholesterol ,  With addition of Cholestoff and diet LDL is at target\par \par 3. His last nuclear stress test September showed a fixed inferior wall defect but no significant ischemia. Echo confirms that same area and shows preserved fxn,\par \par 4. Only mild bilateral carotid plaquing\par \par 5.Blood pressure, seems quite adequately controlled.\par \par Plan:\par 1. Continuation of current meds\par 2. Encouraged to increase exercise regimen to 3-4 days a week as tolerated.\par 3. Repeat blood work at [facility] and have faxed to my office.\par 4. Diet modification to help prompt weight loss\par 5.  Repeat noninvasive evaluation in 6 months to include echocardiography and nuclear stress testing.\par \par \par \par

## 2022-09-05 ENCOUNTER — APPOINTMENT (OUTPATIENT)
Dept: ORTHOPEDIC SURGERY | Facility: CLINIC | Age: 82
End: 2022-09-05

## 2022-09-05 VITALS — BODY MASS INDEX: 30.8 KG/M2 | WEIGHT: 220 LBS | HEIGHT: 71 IN

## 2022-09-05 DIAGNOSIS — M17.12 UNILATERAL PRIMARY OSTEOARTHRITIS, LEFT KNEE: ICD-10-CM

## 2022-09-05 PROCEDURE — 99203 OFFICE O/P NEW LOW 30 MIN: CPT | Mod: 25

## 2022-09-05 PROCEDURE — 73562 X-RAY EXAM OF KNEE 3: CPT | Mod: LT

## 2022-09-05 PROCEDURE — 20610 DRAIN/INJ JOINT/BURSA W/O US: CPT | Mod: LT

## 2022-09-05 NOTE — IMAGING
[Right] : right knee [advanced tricompartmental OA with medial compartment narrowing and varus alignment] : advanced tricompartmental OA with medial compartment narrowing and varus alignment

## 2022-09-05 NOTE — ASSESSMENT
[FreeTextEntry1] : We reviewed the findings and his history.  Prognosis outlined.  Continue OTC brace, prn.  CSI today - tolerated well.  Will fu with Dr. Olivares at his request.

## 2022-09-05 NOTE — PHYSICAL EXAM
[Left] : left knee [5___] : quadriceps 5[unfilled]/5 [] : patient ambulates with assistive device [[id number]] : flexion 120 degrees [de-identified] : extension 3 degrees

## 2022-09-05 NOTE — HISTORY OF PRESENT ILLNESS
[Dull/Aching] : dull/aching [Sharp] : sharp [Intermittent] : intermittent [Nothing helps with pain getting better] : Nothing helps with pain getting better [] : no [FreeTextEntry1] : LT Knee [FreeTextEntry5] : Pt has Hx of OA in both knees. Pt does not remember and injury.

## 2022-09-05 NOTE — REASON FOR VISIT
Underwent aorto-bifemoral bypass artery and reports chronic leg pains but this is unchanged [FreeTextEntry1] : Patient diagnosed with prostate cancer October 2020.  He has just completed radiation therapy which he reportedly tolerated well.\par \par  He denies any chest pain, shortness of breath, palpitations, PND, orthopnea, or edema.  No other medical problems.\par \par HX of statin intolerance, now tolerating Cholestoff\par \par No other new medical issues or recent hospitalizations\par \par He has no regular exercise regimen, but he, is physically very active without any exertional complaints.

## 2022-09-05 NOTE — PHYSICAL EXAM
[Left] : left knee [5___] : quadriceps 5[unfilled]/5 [] : patient ambulates with assistive device [[id number]] : flexion 120 degrees [de-identified] : extension 3 degrees

## 2022-09-05 NOTE — PROCEDURE
[Large Joint Injection] : Large joint injection [Left] : of the left [Knee] : knee [Pain] : pain [Inflammation] : inflammation [Betadine] : betadine [___ cc    1%] : Lidocaine ~Vcc of 1%  [___ cc    40mg] : Methylprednisolone (Depomedrol) ~Vcc of 40 mg  [] : Patient tolerated procedure well [Call if redness, pain or fever occur] : call if redness, pain or fever occur [Apply ice for 15min out of every hour for the next 12-24 hours as tolerated] : apply ice for 15 minutes out of every hour for the next 12-24 hours as tolerated [Patient was advised to rest the joint(s) for ____ days] : patient was advised to rest the joint(s) for [unfilled] days [Previous OTC use and PT nontherapeutic] : patient has tried OTC's including aspirin, Ibuprofen, Aleve, etc or prescription NSAIDS, and/or exercises at home and/or physical therapy without satisfactory response [Patient had decreased mobility in the joint] : patient had decreased mobility in the joint [Risks, benefits, alternatives discussed / Verbal consent obtained] : the risks benefits, and alternatives have been discussed, and verbal consent was obtained

## 2022-09-05 NOTE — REASON FOR VISIT
[FreeTextEntry2] : This is an 82 year old M with a history of L knee OA.  Had a visco series some years ago without relief.  Supplements helped his pain untli recently.  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  Pain at night.  First steps are the worst.  No numbness.  Advil hasn't helped.  His wife is here.

## 2022-12-07 ENCOUNTER — MED ADMIN CHARGE (OUTPATIENT)
Age: 82
End: 2022-12-07

## 2022-12-07 ENCOUNTER — APPOINTMENT (OUTPATIENT)
Dept: CARDIOLOGY | Facility: CLINIC | Age: 82
End: 2022-12-07

## 2022-12-07 PROCEDURE — A9500: CPT

## 2022-12-07 PROCEDURE — 93015 CV STRESS TEST SUPVJ I&R: CPT

## 2022-12-07 PROCEDURE — 78452 HT MUSCLE IMAGE SPECT MULT: CPT

## 2022-12-07 RX ADMIN — REGADENOSON 0 MG/5ML: 0.08 INJECTION, SOLUTION INTRAVENOUS at 00:00

## 2022-12-08 RX ORDER — REGADENOSON 0.08 MG/ML
0.4 INJECTION, SOLUTION INTRAVENOUS
Qty: 4 | Refills: 0 | Status: COMPLETED | OUTPATIENT
Start: 2022-12-07

## 2022-12-14 ENCOUNTER — APPOINTMENT (OUTPATIENT)
Dept: CARDIOLOGY | Facility: CLINIC | Age: 82
End: 2022-12-14

## 2022-12-21 ENCOUNTER — APPOINTMENT (OUTPATIENT)
Dept: CARDIOLOGY | Facility: CLINIC | Age: 82
End: 2022-12-21

## 2023-03-01 RX ORDER — KIT FOR THE PREPARATION OF TECHNETIUM TC99M SESTAMIBI 1 MG/5ML
INJECTION, POWDER, LYOPHILIZED, FOR SOLUTION PARENTERAL
Refills: 0 | Status: COMPLETED | OUTPATIENT
Start: 2023-03-01

## 2023-03-01 RX ADMIN — KIT FOR THE PREPARATION OF TECHNETIUM TC99M SESTAMIBI 0: 1 INJECTION, POWDER, LYOPHILIZED, FOR SOLUTION PARENTERAL at 00:00

## 2023-03-20 NOTE — REASON FOR VISIT
Please refer to Mychart message that has been sent to you by patient [FreeTextEntry1] : Patient diagnosed with prostate cancer October 2020, treated with radiation therapy which he reportedly tolerated well.\par \par  He denies any chest pain, shortness of breath, palpitations, PND, orthopnea, or edema.  No other medical problems.  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

## 2023-05-18 ENCOUNTER — OFFICE (OUTPATIENT)
Dept: URBAN - METROPOLITAN AREA CLINIC 113 | Facility: CLINIC | Age: 83
Setting detail: OPHTHALMOLOGY
End: 2023-05-18
Payer: MEDICARE

## 2023-05-18 DIAGNOSIS — H25.11: ICD-10-CM

## 2023-05-18 DIAGNOSIS — H25.12: ICD-10-CM

## 2023-05-18 DIAGNOSIS — H35.033: ICD-10-CM

## 2023-05-18 DIAGNOSIS — H43.393: ICD-10-CM

## 2023-05-18 DIAGNOSIS — H25.13: ICD-10-CM

## 2023-05-18 PROCEDURE — 92136 OPHTHALMIC BIOMETRY: CPT | Performed by: OPHTHALMOLOGY

## 2023-05-18 PROCEDURE — 99204 OFFICE O/P NEW MOD 45 MIN: CPT | Performed by: OPHTHALMOLOGY

## 2023-05-18 PROCEDURE — 92250 FUNDUS PHOTOGRAPHY W/I&R: CPT | Performed by: OPHTHALMOLOGY

## 2023-05-18 ASSESSMENT — SPHEQUIV_DERIVED
OS_SPHEQUIV: -0.5
OD_SPHEQUIV: -2
OD_SPHEQUIV: -2
OS_SPHEQUIV: -0.5

## 2023-05-18 ASSESSMENT — KERATOMETRY
OD_K2POWER_DIOPTERS: 44.25
OS_CYLAXISANGLE_DEGREES: 092
OS_K1POWER_DIOPTERS: 43.00
OD_K1K2_AVERAGE: 43.875
OS_CYLPOWER_DEGREES: 1.5
OD_CYLPOWER_DEGREES: 0.75
OS_AXISANGLE2_DEGREES: 092
OD_K1POWER_DIOPTERS: 43.50
OD_AXISANGLE_DEGREES: 7
OS_AXISANGLE_DEGREES: 2
OS_K1POWER_DIOPTERS: 43.00
OS_K2POWER_DIOPTERS: 44.50
OD_K2POWER_DIOPTERS: 44.25
OD_CYLAXISANGLE_DEGREES: 097
OS_AXISANGLE_DEGREES: 092
OD_AXISANGLE_DEGREES: 097
OS_K2POWER_DIOPTERS: 44.50
OD_AXISANGLE2_DEGREES: 097
OD_K1POWER_DIOPTERS: 43.50
OS_K1K2_AVERAGE: 43.75

## 2023-05-18 ASSESSMENT — REFRACTION_AUTOREFRACTION
OS_SPHERE: +0.50
OD_SPHERE: -1.75
OD_CYLINDER: -0.50
OS_AXIS: 009
OD_AXIS: 156
OS_CYLINDER: -2.00

## 2023-05-18 ASSESSMENT — CONFRONTATIONAL VISUAL FIELD TEST (CVF)
OD_FINDINGS: FULL
OS_FINDINGS: FULL

## 2023-05-18 ASSESSMENT — TONOMETRY
OD_IOP_MMHG: 12
OS_IOP_MMHG: 15

## 2023-05-18 ASSESSMENT — REFRACTION_CURRENTRX
OS_OVR_VA: 20/
OS_CYLINDER: -1.00
OS_AXIS: 002
OD_OVR_VA: 20/
OD_SPHERE: -1.00
OD_CYLINDER: -0.50
OS_SPHERE: -0.25
OD_AXIS: 145

## 2023-05-18 ASSESSMENT — AXIALLENGTH_DERIVED
OD_AL: 24.2513
OD_AL: 24.2513
OS_AL: 23.6953
OS_AL: 23.6953

## 2023-05-18 ASSESSMENT — REFRACTION_MANIFEST
OS_SPHERE: +0.50
OD_AXIS: 156
OD_CYLINDER: -0.50
OS_VA1: 20/50
OS_CYLINDER: -2.00
OD_SPHERE: -1.75
OS_AXIS: 009
OD_VA1: 20/40

## 2023-05-18 ASSESSMENT — VISUAL ACUITY
OS_BCVA: 20/40
OD_BCVA: 20/50

## 2023-06-06 ENCOUNTER — APPOINTMENT (OUTPATIENT)
Dept: CARDIOLOGY | Facility: CLINIC | Age: 83
End: 2023-06-06
Payer: MEDICARE

## 2023-06-06 VITALS
DIASTOLIC BLOOD PRESSURE: 80 MMHG | HEART RATE: 61 BPM | RESPIRATION RATE: 16 BRPM | BODY MASS INDEX: 31.64 KG/M2 | SYSTOLIC BLOOD PRESSURE: 124 MMHG | HEIGHT: 71 IN | WEIGHT: 226 LBS | OXYGEN SATURATION: 97 %

## 2023-06-06 DIAGNOSIS — C61 MALIGNANT NEOPLASM OF PROSTATE: ICD-10-CM

## 2023-06-06 PROCEDURE — 93000 ELECTROCARDIOGRAM COMPLETE: CPT

## 2023-06-06 PROCEDURE — 99214 OFFICE O/P EST MOD 30 MIN: CPT

## 2023-06-06 NOTE — REASON FOR VISIT
[FreeTextEntry1] : CHIEF COMPLAINT:  The patient is here for cardiac reevaluation.  \par \par History of:\par 1.	Coronary artery disease, prior inferior wall myocardial infarction.\par 2.	Hyperlipidemia.\par 3.	Mild obesity.\par \par \par Patient diagnosed with prostate cancer October 2020, treated with radiation therapy which he reportedly tolerated well.\par \par  He denies any chest pain, shortness of breath, palpitations, PND, orthopnea, or edema. \par Bad back and pain requiring of epidural injections currently limiting his activity.\par \par HX of statin intolerance, now tolerating Cholestoff\par \par No other new medical issues or recent hospitalizations\par \par

## 2023-06-06 NOTE — PHYSICAL EXAM
[Normal Conjunctiva] : the conjunctiva exhibited no abnormalities [Eyelids - No Xanthelasma] : the eyelids demonstrated no xanthelasmas [Normal Oral Mucosa] : normal oral mucosa [No Oral Pallor] : no oral pallor [No Oral Cyanosis] : no oral cyanosis [Normal Jugular Venous A Waves Present] : normal jugular venous A waves present [Normal Jugular Venous V Waves Present] : normal jugular venous V waves present [No Jugular Venous Bone A Waves] : no jugular venous bone A waves [Respiration, Rhythm And Depth] : normal respiratory rhythm and effort [Exaggerated Use Of Accessory Muscles For Inspiration] : no accessory muscle use [Auscultation Breath Sounds / Voice Sounds] : lungs were clear to auscultation bilaterally [Heart Rate And Rhythm] : heart rate and rhythm were normal [Heart Sounds] : normal S1 and S2 [Murmurs] : no murmurs present [Abnormal Walk] : normal gait [Gait - Sufficient For Exercise Testing] : the gait was sufficient for exercise testing [Nail Clubbing] : no clubbing of the fingernails [Cyanosis, Localized] : no localized cyanosis [Petechial Hemorrhages (___cm)] : no petechial hemorrhages [Skin Color & Pigmentation] : normal skin color and pigmentation [] : no rash [No Venous Stasis] : no venous stasis [Skin Lesions] : no skin lesions [No Skin Ulcers] : no skin ulcer [No Xanthoma] : no  xanthoma was observed [Oriented To Time, Place, And Person] : oriented to person, place, and time [Affect] : the affect was normal [Mood] : the mood was normal [No Anxiety] : not feeling anxious [FreeTextEntry1] : Obese soft nontender no masses

## 2023-06-06 NOTE — HISTORY OF PRESENT ILLNESS
[FreeTextEntry1] : Patient here to review the results of a nuclear stress test December 2022.\par An echocardiogram was canceled by the patient.\par \par

## 2023-06-06 NOTE — ASSESSMENT
[FreeTextEntry1] : ECG:  Sinus arrhythmia at 61  bpm\par -Old inferior infarct. \par -Nonspecific intraventricular conduction delay\par \par Laboratory data \par ----------1/27/19---2/2/18--4/6/22--5/27/23\par Chol-------108------194------91-------101\par HDL---------37-------32-----32---------35\par LDL---------43------125-----38---------36\par I8f---------3.9-------5.9---6.0---------5.8\par \par Carotid study 10/26/18:\par Mild bilateral plaquing. No hemodynamically significant stenosis.\par \par Echocardiogram 10/26/18:\par Small inferior wall MI. Left ventricular ejection fraction 70%.\par Mild mitral and tricuspid insufficiency.\par Unchanged from prior.\par \par Pharmacologic nuclear stress 12/7/2023:\par Fixed inferior wall defect consistent with infarction.\par No evidence of ischemia.\par Unchanged from prior study.\par \par Nuclear stress 1/8/2020:\par Exercise time: 6 minutes\par Heart rate: 146 (109%)\par SPECT imaging primarily fixed inferior wall and apical defects.\par                          No ischemia is demonstrated.\par Seems unchanged in comparison to prior stress test.\par \par 9/7/17, NUCLEAR SUMMARY\par 1. Abnormal Sestamibi myocardial perfusion SPECT imaging.\par 2. There was a small sized, moderate intensity, fixed defect of the basal inferior, basal inferoseptal\par and mid inferior wall consistent with infarction.\par 3. Left ventricular function was low normal with an EF of 52%.\par 4. There is mild hypokinesis of the basal inferior and mid inferior walls.\par 5. The EKG was negative for ischemia.\par 6. The patient developed occasional PACs during exercise.\par 7. The blood pressure response was normal.\par 8. The patient developed shortness of breath during the stress exam. The symptoms resolved with\par rest.\par 9. The test was terminated due to shortness of breath.\par 10. The patient's functional capacity was excellent.\par 11. The Duke Treadmill Score was 7, consistent with low risk.\par 12. When compared to prior study dated 5/28/2015, there has been no significant interval change.\par 13. Recommend clinical correlation with the above findings.\par \par Impression: \par 1. Patient has maintained a BMI of 30 and aware of implications\par \par 2. EKG reflects NSR, with frequent asymptomatic APCs\par \par 2. Cholesterol ,  With addition of Cholestoff and diet LDL is at target\par \par 3. His last nuclear stress test 12/7/2022 showed a fixed inferior wall defect but no significant ischemia. Echo confirms that same area and shows preserved fxn,\par \par 4. Only mild bilateral carotid plaquing\par \par 5.Blood pressure, seems quite adequately controlled.\par \par Plan:\par 1. Continuation of current meds\par 2. Encouraged to increase exercise regimen to 3-4 days a week as tolerated.\par 3. Repeat blood work at [facility] office and have faxed to my office.\par 4. Diet modification to help prompt weight loss\par 5.  Repeat noninvasive evaluation in 6 months to include echocardiography \par \par \par \par

## 2023-08-09 ENCOUNTER — ASC (OUTPATIENT)
Dept: URBAN - METROPOLITAN AREA SURGERY 8 | Facility: SURGERY | Age: 83
Setting detail: OPHTHALMOLOGY
End: 2023-08-09
Payer: MEDICARE

## 2023-08-09 DIAGNOSIS — H25.12: ICD-10-CM

## 2023-08-09 PROCEDURE — 66984 XCAPSL CTRC RMVL W/O ECP: CPT | Performed by: OPHTHALMOLOGY

## 2023-08-10 ENCOUNTER — RX ONLY (RX ONLY)
Age: 83
End: 2023-08-10

## 2023-08-10 ENCOUNTER — OFFICE (OUTPATIENT)
Dept: URBAN - METROPOLITAN AREA CLINIC 105 | Facility: CLINIC | Age: 83
Setting detail: OPHTHALMOLOGY
End: 2023-08-10
Payer: MEDICARE

## 2023-08-10 DIAGNOSIS — Z96.1: ICD-10-CM

## 2023-08-10 PROCEDURE — 99024 POSTOP FOLLOW-UP VISIT: CPT | Performed by: REGISTERED NURSE

## 2023-08-10 ASSESSMENT — REFRACTION_CURRENTRX
OS_AXIS: 002
OD_OVR_VA: 20/
OD_CYLINDER: -0.50
OD_SPHERE: -1.00
OS_SPHERE: -0.25
OD_AXIS: 145
OS_CYLINDER: -1.00
OS_OVR_VA: 20/

## 2023-08-10 ASSESSMENT — KERATOMETRY
OS_K1POWER_DIOPTERS: 43.75
OS_AXISANGLE_DEGREES: 092
OD_K1POWER_DIOPTERS: 43.25
OD_AXISANGLE_DEGREES: 082
OS_K2POWER_DIOPTERS: 45.75
OD_K2POWER_DIOPTERS: 44.00

## 2023-08-10 ASSESSMENT — AXIALLENGTH_DERIVED
OD_AL: 24.3487
OD_AL: 24.3487
OS_AL: 23.3782
OS_AL: 23.3305

## 2023-08-10 ASSESSMENT — REFRACTION_MANIFEST
OS_AXIS: 009
OD_CYLINDER: -0.50
OS_CYLINDER: -2.00
OS_SPHERE: +0.50
OD_AXIS: 156
OD_VA1: 20/40
OS_VA1: 20/50
OD_SPHERE: -1.75

## 2023-08-10 ASSESSMENT — TONOMETRY
OS_IOP_MMHG: 15
OD_IOP_MMHG: 17

## 2023-08-10 ASSESSMENT — REFRACTION_AUTOREFRACTION
OS_SPHERE: +0.50
OS_AXIS: 003
OS_CYLINDER: -2.25
OD_AXIS: 145
OD_SPHERE: -1.75
OD_CYLINDER: -0.50

## 2023-08-10 ASSESSMENT — CORNEAL EDEMA - FOLDS/STRIAE: OS_FOLDSSTRIAE: T

## 2023-08-10 ASSESSMENT — VISUAL ACUITY
OS_BCVA: 20/150-1
OD_BCVA: 20/60-2

## 2023-08-10 ASSESSMENT — CONFRONTATIONAL VISUAL FIELD TEST (CVF)
OD_FINDINGS: FULL
OS_FINDINGS: FULL

## 2023-08-10 ASSESSMENT — CORNEAL EDEMA CLINICAL DESCRIPTION: OS_CORNEALEDEMA: 1+

## 2023-08-10 ASSESSMENT — SPHEQUIV_DERIVED
OS_SPHEQUIV: -0.5
OD_SPHEQUIV: -2
OD_SPHEQUIV: -2
OS_SPHEQUIV: -0.625

## 2023-08-17 ENCOUNTER — OFFICE (OUTPATIENT)
Dept: URBAN - METROPOLITAN AREA CLINIC 105 | Facility: CLINIC | Age: 83
Setting detail: OPHTHALMOLOGY
End: 2023-08-17
Payer: MEDICARE

## 2023-08-17 DIAGNOSIS — H25.11: ICD-10-CM

## 2023-08-17 DIAGNOSIS — Z96.1: ICD-10-CM

## 2023-08-17 PROCEDURE — 99024 POSTOP FOLLOW-UP VISIT: CPT | Performed by: REGISTERED NURSE

## 2023-08-17 PROCEDURE — 92136 OPHTHALMIC BIOMETRY: CPT | Performed by: REGISTERED NURSE

## 2023-08-17 ASSESSMENT — REFRACTION_CURRENTRX
OS_SPHERE: -0.25
OS_OVR_VA: 20/
OD_CYLINDER: -0.50
OD_AXIS: 148
OS_AXIS: 005
OD_OVR_VA: 20/
OS_CYLINDER: -1.00
OD_SPHERE: -1.00

## 2023-08-17 ASSESSMENT — SPHEQUIV_DERIVED
OD_SPHEQUIV: -2
OD_SPHEQUIV: -2
OS_SPHEQUIV: -0.5
OS_SPHEQUIV: 0
OS_SPHEQUIV: -0.125

## 2023-08-17 ASSESSMENT — REFRACTION_AUTOREFRACTION
OD_AXIS: 164
OD_SPHERE: -1.75
OD_CYLINDER: -0.50
OS_SPHERE: +0.75
OS_AXIS: 009
OS_CYLINDER: -1.75

## 2023-08-17 ASSESSMENT — AXIALLENGTH_DERIVED
OS_AL: 23.3673
OS_AL: 23.3196
OS_AL: 23.5115
OD_AL: 24.2999
OD_AL: 24.2999

## 2023-08-17 ASSESSMENT — REFRACTION_MANIFEST
OS_CYLINDER: -2.00
OS_SPHERE: +0.75
OD_SPHERE: -1.75
OD_CYLINDER: -0.50
OD_VA1: 20/40
OS_AXIS: 009
OS_CYLINDER: -1.50
OS_VA1: 20/50
OD_AXIS: 156
OS_VA1: 20/20-1
OS_AXIS: 009
OS_SPHERE: +0.50

## 2023-08-17 ASSESSMENT — VISUAL ACUITY
OS_BCVA: 20/80-2
OD_BCVA: 20/25+

## 2023-08-17 ASSESSMENT — KERATOMETRY
OD_AXISANGLE_DEGREES: 099
OD_K1POWER_DIOPTERS: 43.25
OS_K1POWER_DIOPTERS: 43.25
OS_AXISANGLE_DEGREES: 095
OD_K2POWER_DIOPTERS: 44.25
OS_K2POWER_DIOPTERS: 45.25

## 2023-08-17 ASSESSMENT — CONFRONTATIONAL VISUAL FIELD TEST (CVF)
OS_FINDINGS: FULL
OD_FINDINGS: FULL

## 2023-08-17 ASSESSMENT — TONOMETRY
OD_IOP_MMHG: 15
OS_IOP_MMHG: 12

## 2023-08-22 ENCOUNTER — ASC (OUTPATIENT)
Dept: URBAN - METROPOLITAN AREA SURGERY 8 | Facility: SURGERY | Age: 83
Setting detail: OPHTHALMOLOGY
End: 2023-08-22
Payer: MEDICARE

## 2023-08-22 DIAGNOSIS — H25.11: ICD-10-CM

## 2023-08-22 PROCEDURE — 66984 XCAPSL CTRC RMVL W/O ECP: CPT | Performed by: OPHTHALMOLOGY

## 2023-08-23 ENCOUNTER — OFFICE (OUTPATIENT)
Dept: URBAN - METROPOLITAN AREA CLINIC 105 | Facility: CLINIC | Age: 83
Setting detail: OPHTHALMOLOGY
End: 2023-08-23
Payer: MEDICARE

## 2023-08-23 DIAGNOSIS — Z96.1: ICD-10-CM

## 2023-08-23 PROCEDURE — 99024 POSTOP FOLLOW-UP VISIT: CPT | Performed by: REGISTERED NURSE

## 2023-08-23 ASSESSMENT — AXIALLENGTH_DERIVED
OS_AL: 23.2221
OD_AL: 24.3977
OS_AL: 23.3645
OS_AL: 23.5572

## 2023-08-23 ASSESSMENT — REFRACTION_AUTOREFRACTION
OS_AXIS: 007
OS_SPHERE: +1.00
OD_CYLINDER: -0.75
OS_CYLINDER: -1.25
OD_AXIS: 027
OD_SPHERE: -+1.00

## 2023-08-23 ASSESSMENT — CONFRONTATIONAL VISUAL FIELD TEST (CVF)
OS_FINDINGS: FULL
OD_FINDINGS: FULL

## 2023-08-23 ASSESSMENT — SPHEQUIV_DERIVED
OS_SPHEQUIV: 0
OS_SPHEQUIV: -0.5
OS_SPHEQUIV: 0.375
OD_SPHEQUIV: -2

## 2023-08-23 ASSESSMENT — REFRACTION_CURRENTRX
OD_AXIS: 148
OS_AXIS: 005
OS_OVR_VA: 20/
OD_CYLINDER: -0.50
OS_CYLINDER: -1.00
OD_SPHERE: -1.00
OS_SPHERE: -0.25
OD_OVR_VA: 20/

## 2023-08-23 ASSESSMENT — KERATOMETRY
OD_AXISANGLE_DEGREES: 110
OS_AXISANGLE_DEGREES: 094
OD_K1POWER_DIOPTERS: 43.00
OD_K2POWER_DIOPTERS: 44.00
OS_K1POWER_DIOPTERS: 43.25
OS_K2POWER_DIOPTERS: 45.00

## 2023-08-23 ASSESSMENT — REFRACTION_MANIFEST
OS_SPHERE: +0.50
OS_VA1: 20/20-1
OS_CYLINDER: -2.00
OD_CYLINDER: -0.50
OS_AXIS: 009
OD_VA1: 20/40
OS_CYLINDER: -1.50
OS_VA1: 20/50
OS_AXIS: 009
OD_AXIS: 156
OS_SPHERE: +0.75
OD_SPHERE: -1.75

## 2023-08-23 ASSESSMENT — CORNEAL EDEMA CLINICAL DESCRIPTION: OD_CORNEALEDEMA: T

## 2023-08-23 ASSESSMENT — TONOMETRY
OD_IOP_MMHG: 16
OS_IOP_MMHG: 12

## 2023-08-23 ASSESSMENT — VISUAL ACUITY
OD_BCVA: 20/30-
OS_BCVA: 20/50

## 2023-08-30 ENCOUNTER — OFFICE (OUTPATIENT)
Dept: URBAN - METROPOLITAN AREA CLINIC 105 | Facility: CLINIC | Age: 83
Setting detail: OPHTHALMOLOGY
End: 2023-08-30
Payer: MEDICARE

## 2023-08-30 DIAGNOSIS — Z96.1: ICD-10-CM

## 2023-08-30 PROCEDURE — 99024 POSTOP FOLLOW-UP VISIT: CPT | Performed by: REGISTERED NURSE

## 2023-08-30 ASSESSMENT — REFRACTION_MANIFEST
OS_SPHERE: +0.50
OD_SPHERE: -1.75
OS_VA1: 20/20-1
OS_AXIS: 009
OS_CYLINDER: -2.00
OS_SPHERE: +0.75
OS_AXIS: 009
OS_CYLINDER: -1.50
OD_AXIS: 156
OD_VA1: 20/40
OD_CYLINDER: -0.50
OS_VA1: 20/50

## 2023-08-30 ASSESSMENT — KERATOMETRY
OS_AXISANGLE_DEGREES: 091
OD_AXISANGLE_DEGREES: 103
OD_K2POWER_DIOPTERS: 44.00
OS_K2POWER_DIOPTERS: 45.00
OD_K1POWER_DIOPTERS: 43.25
OS_K1POWER_DIOPTERS: 43.00

## 2023-08-30 ASSESSMENT — REFRACTION_AUTOREFRACTION
OD_CYLINDER: -0.50
OS_CYLINDER: -1.75
OD_SPHERE: +0.50
OD_AXIS: 028
OS_SPHERE: +1.00
OS_AXIS: 004

## 2023-08-30 ASSESSMENT — VISUAL ACUITY
OS_BCVA: 20/20-
OD_BCVA: 20/25-2

## 2023-08-30 ASSESSMENT — REFRACTION_CURRENTRX
OD_CYLINDER: -0.50
OD_AXIS: 148
OS_CYLINDER: -1.00
OD_OVR_VA: 20/
OS_OVR_VA: 20/
OS_AXIS: 005
OD_SPHERE: -1.00
OS_OVR_VA: 20/
OS_SPHERE: -0.25
OS_SPHERE: +2.50
OD_SPHERE: +2.50
OD_OVR_VA: 20/

## 2023-08-30 ASSESSMENT — AXIALLENGTH_DERIVED
OD_AL: 23.4495
OS_AL: 23.6031
OS_AL: 23.4097
OD_AL: 24.3487
OS_AL: 23.3618

## 2023-08-30 ASSESSMENT — TONOMETRY
OD_IOP_MMHG: 13
OS_IOP_MMHG: 13

## 2023-08-30 ASSESSMENT — CONFRONTATIONAL VISUAL FIELD TEST (CVF)
OD_FINDINGS: FULL
OS_FINDINGS: FULL

## 2023-08-30 ASSESSMENT — SPHEQUIV_DERIVED
OS_SPHEQUIV: -0.5
OD_SPHEQUIV: -2
OS_SPHEQUIV: 0.125
OS_SPHEQUIV: 0
OD_SPHEQUIV: 0.25

## 2023-10-12 ENCOUNTER — RX ONLY (RX ONLY)
Age: 83
End: 2023-10-12

## 2023-10-12 ENCOUNTER — OFFICE (OUTPATIENT)
Dept: URBAN - METROPOLITAN AREA CLINIC 105 | Facility: CLINIC | Age: 83
Setting detail: OPHTHALMOLOGY
End: 2023-10-12
Payer: MEDICARE

## 2023-10-12 DIAGNOSIS — Z96.1: ICD-10-CM

## 2023-10-12 PROCEDURE — 99024 POSTOP FOLLOW-UP VISIT: CPT | Performed by: REGISTERED NURSE

## 2023-10-12 ASSESSMENT — SPHEQUIV_DERIVED
OS_SPHEQUIV: 0
OD_SPHEQUIV: 0.5
OS_SPHEQUIV: 0.125
OS_SPHEQUIV: -0.5
OD_SPHEQUIV: -2

## 2023-10-12 ASSESSMENT — REFRACTION_MANIFEST
OS_VA1: 20/20-1
OS_CYLINDER: -2.00
OD_VA1: 20/40
OS_CYLINDER: -1.50
OS_SPHERE: +0.75
OD_CYLINDER: -0.50
OD_SPHERE: -1.75
OS_AXIS: 009
OS_SPHERE: +0.50
OS_AXIS: 009
OS_VA1: 20/50
OD_AXIS: 156

## 2023-10-12 ASSESSMENT — CONFRONTATIONAL VISUAL FIELD TEST (CVF)
OS_FINDINGS: FULL
OD_FINDINGS: FULL

## 2023-10-12 ASSESSMENT — REFRACTION_CURRENTRX
OS_OVR_VA: 20/
OD_SPHERE: +2.50
OS_SPHERE: +2.50
OS_OVR_VA: 20/
OD_OVR_VA: 20/
OD_OVR_VA: 20/

## 2023-10-12 ASSESSMENT — KERATOMETRY
OD_K2POWER_DIOPTERS: 44.00
OS_K2POWER_DIOPTERS: 45.00
OS_AXISANGLE_DEGREES: 093
OD_K1POWER_DIOPTERS: 43.00
OS_K1POWER_DIOPTERS: 43.25
OD_AXISANGLE_DEGREES: 085

## 2023-10-12 ASSESSMENT — VISUAL ACUITY
OS_BCVA: 20/20-1
OD_BCVA: 20/25-2

## 2023-10-12 ASSESSMENT — REFRACTION_AUTOREFRACTION
OS_SPHERE: +0.75
OD_SPHERE: +0.75
OD_CYLINDER: -0.50
OS_CYLINDER: -1.25
OS_AXIS: 004
OD_AXIS: 012

## 2023-10-12 ASSESSMENT — AXIALLENGTH_DERIVED
OS_AL: 23.3645
OS_AL: 23.5572
OD_AL: 23.3987
OD_AL: 24.3977
OS_AL: 23.3169

## 2023-10-12 ASSESSMENT — TONOMETRY
OD_IOP_MMHG: 14
OS_IOP_MMHG: 12

## 2023-12-04 ENCOUNTER — APPOINTMENT (OUTPATIENT)
Dept: CARDIOLOGY | Facility: CLINIC | Age: 83
End: 2023-12-04
Payer: MEDICARE

## 2023-12-04 PROCEDURE — 93306 TTE W/DOPPLER COMPLETE: CPT

## 2023-12-12 ENCOUNTER — APPOINTMENT (OUTPATIENT)
Dept: CARDIOLOGY | Facility: CLINIC | Age: 83
End: 2023-12-12
Payer: MEDICARE

## 2023-12-12 VITALS
RESPIRATION RATE: 16 BRPM | SYSTOLIC BLOOD PRESSURE: 140 MMHG | HEIGHT: 71 IN | DIASTOLIC BLOOD PRESSURE: 80 MMHG | WEIGHT: 221 LBS | HEART RATE: 67 BPM | BODY MASS INDEX: 30.94 KG/M2 | OXYGEN SATURATION: 98 %

## 2023-12-12 PROCEDURE — 99214 OFFICE O/P EST MOD 30 MIN: CPT

## 2023-12-12 PROCEDURE — 93000 ELECTROCARDIOGRAM COMPLETE: CPT

## 2024-06-05 RX ORDER — METOPROLOL SUCCINATE 25 MG/1
25 TABLET, EXTENDED RELEASE ORAL
Qty: 90 | Refills: 2 | Status: ACTIVE | COMMUNITY
Start: 2018-10-08 | End: 1900-01-01

## 2024-07-02 ENCOUNTER — APPOINTMENT (OUTPATIENT)
Dept: CARDIOLOGY | Facility: CLINIC | Age: 84
End: 2024-07-02
Payer: MEDICARE

## 2024-07-02 VITALS
DIASTOLIC BLOOD PRESSURE: 70 MMHG | BODY MASS INDEX: 31.08 KG/M2 | SYSTOLIC BLOOD PRESSURE: 122 MMHG | OXYGEN SATURATION: 97 % | RESPIRATION RATE: 16 BRPM | HEART RATE: 65 BPM | WEIGHT: 222 LBS | HEIGHT: 71 IN

## 2024-07-02 DIAGNOSIS — I25.10 ATHEROSCLEROTIC HEART DISEASE OF NATIVE CORONARY ARTERY W/OUT ANGINA PECTORIS: ICD-10-CM

## 2024-07-02 DIAGNOSIS — I10 ESSENTIAL (PRIMARY) HYPERTENSION: ICD-10-CM

## 2024-07-02 DIAGNOSIS — E78.5 HYPERLIPIDEMIA, UNSPECIFIED: ICD-10-CM

## 2024-07-02 DIAGNOSIS — I25.5 ISCHEMIC CARDIOMYOPATHY: ICD-10-CM

## 2024-07-02 PROCEDURE — 99214 OFFICE O/P EST MOD 30 MIN: CPT

## 2024-07-02 PROCEDURE — 93000 ELECTROCARDIOGRAM COMPLETE: CPT

## 2024-11-01 ENCOUNTER — APPOINTMENT (OUTPATIENT)
Dept: CARDIOLOGY | Facility: CLINIC | Age: 84
End: 2024-11-01
Payer: MEDICARE

## 2024-11-01 VITALS
WEIGHT: 230 LBS | HEIGHT: 71 IN | OXYGEN SATURATION: 97 % | HEART RATE: 66 BPM | DIASTOLIC BLOOD PRESSURE: 80 MMHG | BODY MASS INDEX: 32.2 KG/M2 | SYSTOLIC BLOOD PRESSURE: 122 MMHG | RESPIRATION RATE: 16 BRPM

## 2024-11-01 DIAGNOSIS — Z01.810 ENCOUNTER FOR PREPROCEDURAL CARDIOVASCULAR EXAMINATION: ICD-10-CM

## 2024-11-01 DIAGNOSIS — I10 ESSENTIAL (PRIMARY) HYPERTENSION: ICD-10-CM

## 2024-11-01 DIAGNOSIS — E78.5 HYPERLIPIDEMIA, UNSPECIFIED: ICD-10-CM

## 2024-11-01 DIAGNOSIS — I25.5 ISCHEMIC CARDIOMYOPATHY: ICD-10-CM

## 2024-11-01 DIAGNOSIS — I25.10 ATHEROSCLEROTIC HEART DISEASE OF NATIVE CORONARY ARTERY W/OUT ANGINA PECTORIS: ICD-10-CM

## 2024-11-01 DIAGNOSIS — E66.9 OBESITY, UNSPECIFIED: ICD-10-CM

## 2024-11-01 PROCEDURE — G2211 COMPLEX E/M VISIT ADD ON: CPT

## 2024-11-01 PROCEDURE — 99214 OFFICE O/P EST MOD 30 MIN: CPT

## 2024-11-01 PROCEDURE — 93000 ELECTROCARDIOGRAM COMPLETE: CPT

## 2024-11-20 ENCOUNTER — APPOINTMENT (OUTPATIENT)
Dept: CARDIOLOGY | Facility: CLINIC | Age: 84
End: 2024-11-20
Payer: MEDICARE

## 2024-11-20 PROCEDURE — 78452 HT MUSCLE IMAGE SPECT MULT: CPT

## 2024-11-20 PROCEDURE — 93015 CV STRESS TEST SUPVJ I&R: CPT

## 2024-11-20 PROCEDURE — A9500: CPT

## 2024-11-20 RX ORDER — REGADENOSON 0.08 MG/ML
0.4 INJECTION, SOLUTION INTRAVENOUS
Refills: 0 | Status: COMPLETED | OUTPATIENT
Start: 2024-11-20

## 2024-11-20 RX ORDER — AMINOPHYLLINE 25 MG/ML
25 INJECTION, SOLUTION INTRAVENOUS
Qty: 0 | Refills: 0 | Status: COMPLETED | OUTPATIENT
Start: 2024-11-20

## 2024-11-20 RX ADMIN — REGADENOSON 0 MG/5ML: 0.08 INJECTION, SOLUTION INTRAVENOUS at 00:00

## 2024-11-26 ENCOUNTER — NON-APPOINTMENT (OUTPATIENT)
Age: 84
End: 2024-11-26

## 2024-12-11 ENCOUNTER — APPOINTMENT (OUTPATIENT)
Dept: CARDIOLOGY | Facility: CLINIC | Age: 84
End: 2024-12-11